# Patient Record
Sex: FEMALE | Race: BLACK OR AFRICAN AMERICAN | NOT HISPANIC OR LATINO | Employment: UNEMPLOYED | URBAN - METROPOLITAN AREA
[De-identification: names, ages, dates, MRNs, and addresses within clinical notes are randomized per-mention and may not be internally consistent; named-entity substitution may affect disease eponyms.]

---

## 2019-05-10 ENCOUNTER — OFFICE VISIT CONVERTED (OUTPATIENT)
Dept: INTERNAL MEDICINE | Facility: CLINIC | Age: 19
End: 2019-05-10
Attending: NURSE PRACTITIONER

## 2020-06-10 ENCOUNTER — OFFICE VISIT CONVERTED (OUTPATIENT)
Dept: INTERNAL MEDICINE | Facility: CLINIC | Age: 20
End: 2020-06-10
Attending: NURSE PRACTITIONER

## 2020-06-10 ENCOUNTER — HOSPITAL ENCOUNTER (OUTPATIENT)
Dept: OTHER | Facility: HOSPITAL | Age: 20
Discharge: HOME | End: 2020-06-10
Attending: NURSE PRACTITIONER

## 2020-06-10 LAB
ALBUMIN SERPL-MCNC: 4 G/DL (ref 3.5–5)
ALBUMIN/GLOB SERPL: 1.1 {RATIO} (ref 1.4–2.6)
ALP SERPL-CCNC: 54 U/L (ref 42–98)
ALT SERPL-CCNC: 17 U/L (ref 10–40)
ANION GAP SERPL CALC-SCNC: 14 MMOL/L (ref 8–19)
AST SERPL-CCNC: 21 U/L (ref 15–50)
BASOPHILS # BLD AUTO: 0.04 10*3/UL (ref 0–0.2)
BASOPHILS NFR BLD AUTO: 0.8 % (ref 0–3)
BILIRUB SERPL-MCNC: 0.24 MG/DL (ref 0.2–1.3)
BUN SERPL-MCNC: 7 MG/DL (ref 5–25)
BUN/CREAT SERPL: 10 {RATIO} (ref 6–20)
CALCIUM SERPL-MCNC: 9.8 MG/DL (ref 8.7–10.4)
CHLORIDE SERPL-SCNC: 102 MMOL/L (ref 99–111)
CHOLEST SERPL-MCNC: 186 MG/DL (ref 107–200)
CHOLEST/HDLC SERPL: 2.9 {RATIO} (ref 3–6)
CONV ABS IMM GRAN: 0.01 10*3/UL (ref 0–0.2)
CONV CO2: 26 MMOL/L (ref 22–32)
CONV IMMATURE GRAN: 0.2 % (ref 0–1.8)
CONV TOTAL PROTEIN: 7.7 G/DL (ref 6.3–8.2)
CREAT UR-MCNC: 0.7 MG/DL (ref 0.5–0.9)
DEPRECATED RDW RBC AUTO: 40 FL (ref 36.4–46.3)
EOSINOPHIL # BLD AUTO: 0.12 10*3/UL (ref 0–0.7)
EOSINOPHIL # BLD AUTO: 2.4 % (ref 0–7)
ERYTHROCYTE [DISTWIDTH] IN BLOOD BY AUTOMATED COUNT: 11.7 % (ref 11.7–14.4)
GFR SERPLBLD BASED ON 1.73 SQ M-ARVRAT: >60 ML/MIN/{1.73_M2}
GLOBULIN UR ELPH-MCNC: 3.7 G/DL (ref 2–3.5)
GLUCOSE SERPL-MCNC: 88 MG/DL (ref 65–99)
HCT VFR BLD AUTO: 43.7 % (ref 37–47)
HDLC SERPL-MCNC: 65 MG/DL (ref 40–60)
HGB BLD-MCNC: 14.5 G/DL (ref 12–16)
LDLC SERPL CALC-MCNC: 101 MG/DL (ref 70–100)
LYMPHOCYTES # BLD AUTO: 1.46 10*3/UL (ref 1–5)
LYMPHOCYTES NFR BLD AUTO: 28.7 % (ref 20–45)
MCH RBC QN AUTO: 31.2 PG (ref 27–31)
MCHC RBC AUTO-ENTMCNC: 33.2 G/DL (ref 33–37)
MCV RBC AUTO: 94 FL (ref 81–99)
MONOCYTES # BLD AUTO: 0.38 10*3/UL (ref 0.2–1.2)
MONOCYTES NFR BLD AUTO: 7.5 % (ref 3–10)
NEUTROPHILS # BLD AUTO: 3.07 10*3/UL (ref 2–8)
NEUTROPHILS NFR BLD AUTO: 60.4 % (ref 30–85)
NRBC CBCN: 0 % (ref 0–0.7)
OSMOLALITY SERPL CALC.SUM OF ELEC: 283 MOSM/KG (ref 273–304)
PLATELET # BLD AUTO: 324 10*3/UL (ref 130–400)
PMV BLD AUTO: 9.4 FL (ref 9.4–12.3)
POTASSIUM SERPL-SCNC: 4.1 MMOL/L (ref 3.5–5.3)
RBC # BLD AUTO: 4.65 10*6/UL (ref 4.2–5.4)
SODIUM SERPL-SCNC: 138 MMOL/L (ref 135–147)
TRIGL SERPL-MCNC: 99 MG/DL (ref 40–150)
TSH SERPL-ACNC: 1.15 M[IU]/L (ref 0.27–4.2)
VLDLC SERPL-MCNC: 20 MG/DL (ref 5–37)
WBC # BLD AUTO: 5.08 10*3/UL (ref 4.8–10.8)

## 2020-07-10 ENCOUNTER — CONVERSION ENCOUNTER (OUTPATIENT)
Dept: INTERNAL MEDICINE | Facility: CLINIC | Age: 20
End: 2020-07-10

## 2020-07-10 ENCOUNTER — OFFICE VISIT CONVERTED (OUTPATIENT)
Dept: INTERNAL MEDICINE | Facility: CLINIC | Age: 20
End: 2020-07-10
Attending: NURSE PRACTITIONER

## 2020-07-15 ENCOUNTER — HOSPITAL ENCOUNTER (OUTPATIENT)
Dept: CARDIOLOGY | Facility: HOSPITAL | Age: 20
Discharge: HOME | End: 2020-07-15
Attending: NURSE PRACTITIONER

## 2021-01-20 ENCOUNTER — HOSPITAL ENCOUNTER (OUTPATIENT)
Dept: OTHER | Facility: HOSPITAL | Age: 21
Discharge: HOME | End: 2021-01-20
Attending: PHYSICIAN ASSISTANT

## 2021-01-20 ENCOUNTER — CONVERSION ENCOUNTER (OUTPATIENT)
Dept: INTERNAL MEDICINE | Facility: CLINIC | Age: 21
End: 2021-01-20

## 2021-01-20 ENCOUNTER — OFFICE VISIT CONVERTED (OUTPATIENT)
Dept: INTERNAL MEDICINE | Facility: CLINIC | Age: 21
End: 2021-01-20
Attending: PHYSICIAN ASSISTANT

## 2021-02-05 ENCOUNTER — HOSPITAL ENCOUNTER (OUTPATIENT)
Dept: OTHER | Facility: HOSPITAL | Age: 21
Discharge: HOME | End: 2021-02-05
Attending: NURSE PRACTITIONER

## 2021-02-05 ENCOUNTER — OFFICE VISIT CONVERTED (OUTPATIENT)
Dept: INTERNAL MEDICINE | Facility: CLINIC | Age: 21
End: 2021-02-05
Attending: NURSE PRACTITIONER

## 2021-02-11 LAB
CONV LAST MENSTURAL PERIOD: NORMAL
SPECIMEN SOURCE: NORMAL
SPECIMEN SOURCE: NORMAL
THIN PREP CVX: NORMAL

## 2021-02-24 ENCOUNTER — OFFICE VISIT CONVERTED (OUTPATIENT)
Dept: INTERNAL MEDICINE | Facility: CLINIC | Age: 21
End: 2021-02-24
Attending: PHYSICIAN ASSISTANT

## 2021-02-24 ENCOUNTER — HOSPITAL ENCOUNTER (OUTPATIENT)
Dept: OTHER | Facility: HOSPITAL | Age: 21
Discharge: HOME | End: 2021-02-24
Attending: PHYSICIAN ASSISTANT

## 2021-02-25 LAB
CONV HIV-1/ HIV-2: NONREACTIVE
HSV I/II IGM: <0.91 RATIO (ref 0–0.9)
HSV1 IGG SER IA-ACNC: <0.91 INDEX (ref 0–0.9)
HSV2 IGG SER IA-ACNC: <0.91 INDEX (ref 0–0.9)
RPR SER QL: NORMAL

## 2021-02-27 LAB
C TRACH RRNA CVX QL NAA+PROBE: NEGATIVE
N GONORRHOEA DNA SPEC QL NAA+PROBE: NEGATIVE

## 2021-03-04 ENCOUNTER — CONVERSION ENCOUNTER (OUTPATIENT)
Dept: INTERNAL MEDICINE | Facility: CLINIC | Age: 21
End: 2021-03-04

## 2021-03-04 ENCOUNTER — OFFICE VISIT CONVERTED (OUTPATIENT)
Dept: INTERNAL MEDICINE | Facility: CLINIC | Age: 21
End: 2021-03-04
Attending: PHYSICIAN ASSISTANT

## 2021-03-04 LAB — B-HEM STREP SPEC QL CULT: POSITIVE

## 2021-05-13 NOTE — PROGRESS NOTES
Progress Note      Patient Name: Shree Saenz   Patient ID: [id number]   Sex: Female   YOB: 2000        Visit Date: July 10, 2020    Provider: TRINO Ferris   Location: [facility]   Location Address: [address] [place] [address] [address]  [place], [place]  [id number]   Location Phone: [phone]          Chief Complaint  · check up from last month for chest pain  · bumps/change in texture under tongue x approx 1 week       History Of Present Illness  Shree Saenz is a 19 year old female who presents for evaluation and treatment of:      Chest pain-  One month follow up for chest pain, patient states symptoms have become less frequent since previous visit. Last episode earlier this week, this was very infrequent to begin with. Denies shortness of breath, diaphoresis. She is not sure if this is because acid reflux medication is working or because her anxiety has improved being at home due to COVID19. EKG WNL at last appointment, patient states she did not receive information on holter monitor and missed the appointment. She would like to reschedule this today.    Patient reports she had some bumps under her tongue that were irritating, have started to resolve. She is due to see a dentist this month.       Past Medical History  Disease Name Date Onset Notes   Acne --  --    Seasonal allergies --  --          Medication List  Name Date Started Instructions   Allegra Allergy 180 mg oral tablet 06/10/2020 take 1 tablet (180 mg) by oral route once daily   Cyclafem 1/35 (28) 1-35 mg-mcg oral tablet 06/10/2020 take 1 tablet by oral route once daily for 30 days   ferrous sulfate 325 mg (65 mg iron) oral tablet  take 1 tablet (325 mg) by oral route once daily   pantoprazole 20 mg oral tablet,delayed release (DR/EC) 06/10/2020 take 1 tablet (20 mg) by oral route once daily   Singulair 10 mg oral tablet 06/10/2020 take 1 tablet (10 mg) by oral route once daily in the evening for 30 days  "        Allergy List  Allergen Name Date Reaction Notes   NO KNOWN DRUG ALLERGIES --  --  --        Allergies Reconciled  Family Medical History  Disease Name Relative/Age Notes   Lung cancer  --          Review of Systems  · Constitutional  o Denies  o : fever, fatigue, weight loss, weight gain  · HENT  o Admits  o : mouth lesions  o Denies  o : dry mouth, dysphagia  · Cardiovascular  o Admits  o : chest pain  o Denies  o : lower extremity edema, palpitations  · Respiratory  o Denies  o : shortness of breath, wheezing, frequent cough, dyspnea on exertion  · Gastrointestinal  o Admits  o : reflux/heartburn  o Denies  o : abdominal pain, nausea, vomiting, constipation  · Psychiatric  o Admits  o : anxiety  o Denies  o : depression, suicidal ideation, homicidal ideation      Vitals  Date Time BP Position Site L\R Cuff Size HR RR TEMP (F) WT  HT  BMI kg/m2 BSA m2 O2 Sat HC       05/10/2019 03:32 /82 Sitting    79 - R 16 97.7 179lbs 0oz 5'  7\" 28.04 1.96 100 %    06/10/2020 03:57 /76 Sitting    100 - R  98.5 179lbs 0oz 5'  7\" 28.04 1.96 100 %    07/10/2020 08:23 /72 Sitting    68 - R 16 98.3 178lbs 4oz 5'  7\" 27.92 1.96 100 %          Physical Examination  · Constitutional  o Appearance  o : no acute distress, well-nourished  · Head and Face  o Head  o :   § Inspection  § : atraumatic, normocephalic  · Eyes  o Eyes  o : extraocular movements intact, no scleral icterus, no conjunctival injection  · Ears, Nose, Mouth and Throat  o Ears  o :   § External Ears  § : normal  o Nose  o :   § Intranasal Exam  § : nares patent  o Oral Cavity  o :   § Oral Mucosa  § : moist mucous membranes  o Throat  o :   § Oropharynx  § : no inflammation or lesions present, tonsils within normal limits  · Respiratory  o Respiratory Effort  o : breathing comfortably, symmetric chest rise  o Auscultation of Lungs  o : clear to asculatation bilaterally, no wheezes, rales, or rhonchii  · Cardiovascular  o Heart  o : "   § Auscultation of Heart  § : regular rate and rhythm, no murmurs, rubs, or gallops  o Peripheral Vascular System  o :   § Extremities  § : no edema  · Lymphatic  o Neck  o : no lymphadenopathy present  o Supraclavicular Nodes  o : no supraclavicular nodes  · Skin and Subcutaneous Tissue  o General Inspection  o : no lesions present, no areas of discoloration, skin turgor normal  · Neurologic  o Mental Status Examination  o :   § Orientation  § : grossly oriented to person, place and time  o Gait and Station  o :   § Gait Screening  § : normal gait  · Psychiatric  o General  o : normal mood and affect              Assessment  · Screening for depression     V79.0/Z13.89  PHQ9 score of 5.  · Chest pain     786.50/R07.9  Improved. EKG WNL 6/2020. Will reschedule holter. Likely secondary to GERD/anxiety. Will determine if further intervention is necessary based on results of holter. Patient to seek medical attention immediately with severe/persistent chest pain, shortness of breath, diaphoresis. Could consider cardiology referral if warranted. Will follow up in three months, sooner if concerns arise.  · GERD (gastroesophageal reflux disease)     530.81/K21.9  Well controlled, continue pantoprazole.  · Anxiety     300.00/F41.9  Well controlled per patient without medications. She will continue to monitor and seek medical attention immediately if she feels that her mental health is deteriorating. Denies SI/HI. Will continue to monitor.   · Mouth symptom     528.9/R19.8  Without lesion on exam. Will provide magic mouthwash for comfort. Patient to follow up with dentistry as scheduled. Will call or return to clinic if symptoms worsen or persist.     Problems Reconciled  Plan  · Orders  o ACO-39: Current medications updated and reviewed () - - 07/10/2020  · Medications  o Medications have been Reconciled  o Transition of Care or Provider Policy  · Instructions  o Depression Screen completed and scanned into the EMR  under the designated folder within the patient's documents.  o Today's PHQ-9 result is 5  o Take all medications as prescribed/directed.  o Patient was educated/instructed on their diagnosis, treatment and medications prior to discharge from the clinic today.  o Patient instructed to seek medical attention urgently for new or worsening symptoms.  o Call the office with any concerns or questions.  · Disposition  o Call or Return if symptoms worsen or persist.  o Follow up in 3 months  o Prescriptions given in clinic            Electronically Signed by: TRINO Ferris -Author on July 10, 2020 09:11:15 AM

## 2021-05-14 VITALS
DIASTOLIC BLOOD PRESSURE: 86 MMHG | BODY MASS INDEX: 27.68 KG/M2 | WEIGHT: 176.37 LBS | TEMPERATURE: 98.4 F | OXYGEN SATURATION: 98 % | HEIGHT: 67 IN | SYSTOLIC BLOOD PRESSURE: 122 MMHG | HEART RATE: 87 BPM

## 2021-05-14 VITALS
DIASTOLIC BLOOD PRESSURE: 82 MMHG | OXYGEN SATURATION: 99 % | HEART RATE: 106 BPM | RESPIRATION RATE: 15 BRPM | TEMPERATURE: 97.9 F | SYSTOLIC BLOOD PRESSURE: 130 MMHG | WEIGHT: 173.5 LBS | HEIGHT: 67 IN | BODY MASS INDEX: 27.23 KG/M2

## 2021-05-14 VITALS
BODY MASS INDEX: 28.92 KG/M2 | HEART RATE: 94 BPM | OXYGEN SATURATION: 100 % | WEIGHT: 184.25 LBS | SYSTOLIC BLOOD PRESSURE: 128 MMHG | TEMPERATURE: 98.5 F | DIASTOLIC BLOOD PRESSURE: 78 MMHG | HEIGHT: 67 IN

## 2021-05-14 VITALS
DIASTOLIC BLOOD PRESSURE: 80 MMHG | BODY MASS INDEX: 28.41 KG/M2 | SYSTOLIC BLOOD PRESSURE: 128 MMHG | WEIGHT: 181 LBS | HEIGHT: 67 IN | TEMPERATURE: 98.1 F | RESPIRATION RATE: 15 BRPM | OXYGEN SATURATION: 99 % | HEART RATE: 89 BPM

## 2021-05-14 NOTE — PROGRESS NOTES
Progress Note      Patient Name: Shree Saenz   Patient ID: [id number]   Sex: Female   YOB: 2000        Visit Date: February 24, 2021    Provider: Michelle Guthrie PA-C   Location: [facility]   Location Address: [address] [place] [address], [address]  [place], [place]  [id number]   Location Phone: [phone]          Chief Complaint  · vaginal lesion       History Of Present Illness  Shree Saenz is a 20 year old /Black female who presents for evaluation and treatment of:      vaginal lesion, noticed 1 wk ago  Feels dry, like irritation. She shaves vaginal area.   Recently had yeast infection and irritation feels similar. Denies discharge. She has had itching.   Admits to not using condoms with intercourse.   She admits to douching at times and using scented soaps. She takes showers.  Denies new soaps or lotion.  Denies fever.   Denies abd pain, NVDC, urgency, frequency, hematuria.       Past Medical History  Disease Name Date Onset Notes   Acne --  --    Seasonal allergies --  --          Medication List  Name Date Started Instructions   Allegra Allergy 180 mg oral tablet 09/21/2020 take 1 tablet (180 mg) by oral route once daily for 90 days   Cyclafem 1/35 (28) 1-35 mg-mcg oral tablet 02/24/2021 take 1 tablet by oral route once daily for 90 days   pantoprazole 20 mg oral tablet,delayed release (DR/EC) 02/24/2021 take 1 tablet (20 mg) by oral route once daily for 90 days   Singulair 10 mg oral tablet 09/21/2020 take 1 tablet (10 mg) by oral route once daily in the evening for 90 days         Allergy List  Allergen Name Date Reaction Notes   NO KNOWN DRUG ALLERGIES --  --  --        Allergies Reconciled  Family Medical History  Disease Name Relative/Age Notes   Lung cancer  --          Social History  Finding Status Start/Stop Quantity Notes   Tobacco Never --/-- --  --          Vitals  Date Time BP Position Site L\R Cuff Size HR RR TEMP (F) WT  HT  BMI kg/m2 BSA m2 O2 Sat  "FR L/min FiO2 HC       01/20/2021 02:03 /78 Sitting    94 - R  98.5 184lbs 4oz 5'  7\" 28.86 1.99 100 %  21%    02/05/2021 01:38 /86 Sitting    87 - R  98.4 176lbs 6oz 5'  7\" 27.62 1.94 98 %  21%    02/24/2021 08:19 /80 Sitting    89 - R 15 98.1 181lbs 0oz 5'  7\" 28.35 1.97 99 %            Physical Examination  · Constitutional  o Appearance  o : no acute distress, well-nourished  · Head and Face  o Head  o :   § Inspection  § : atraumatic, normocephalic  · Eyes  o Eyes  o : extraocular movements intact, no scleral icterus, no conjunctival injection  · Ears, Nose, Mouth and Throat  o Ears  o :   § External Ears  § : normal  o Nose  o :   § Intranasal Exam  § : nares patent  o Oral Cavity  o :   § Oral Mucosa  § : moist mucous membranes  · Respiratory  o Respiratory Effort  o : breathing comfortably, symmetric chest rise  o Auscultation of Lungs  o : clear to asculatation bilaterally, no wheezes, rales, or rhonchii  · Cardiovascular  o Heart  o :   § Auscultation of Heart  § : regular rate and rhythm, no murmurs, rubs, or gallops  o Peripheral Vascular System  o :   § Extremities  § : no edema  · Gastrointestinal  o Abdominal Examination  o :   § Abdomen  § : bowel sounds present, non-distended, non-tender  · Genitourinary  o FEMALE  EXAM:  o :   §  and rectal exam deferred  § :  and rectal exam deferred  o External Genitalia  o : no inflammation, no lesions present  o Vagina  o : normal vaginal vault, no discharge present, no inflammatory lesions present, no masses present  o Urethra  o :   § Urethral Meatus  § : no inflammation present  o Bladder  o : nontender to palpation  o Cervix  o : appearance healthy, no lesions present, nontender to palpation, no discharges, no bleeding present, normal midline position  o Uterus  o : nontender to palpation, no masses present, position midline/midplane  o Adnexa  o : no tenderness or masses present on bimanual examination  o Perineum  o : perineum " within normal limits  · Skin and Subcutaneous Tissue  o General Inspection  o : no lesions present, no areas of discoloration, skin turgor normal  · Neurologic  o Mental Status Examination  o :   § Orientation  § : grossly oriented to person, place and time  o Gait and Station  o :   § Gait Screening  § : normal gait  · Psychiatric  o General  o : normal mood and affect                    Assessment  · Vaginitis     616.10/N76.0  Discussed ddx vaginitis, vaginal screen sent today. STD testing sent due to high risk behavior and recent irritation noted on pap (reviewed). Will tx based on results  · High risk sexual behavior     V69.2/Z72.51  STD testing today. Encouraged condom use.      Plan  · Orders  o ACO-39: Current medications updated and reviewed (, 1159F) - - 02/24/2021  o STD Panel (HSV 1 and 2 IgG/IgM, HIV, RPR, GC/Chlamydia) [facility] (50787, 47612, 03445, 41354, 80033, CTNGX, ) - 616.10/N76.0, V69.2/Z72.51 - 02/24/2021  o Vaginal Screen (Candida, Gardnerella & Trichomonas) (19327) - 616.10/N76.0, V69.2/Z72.51 - 02/24/2021  · Medications  o Cyclafem 1/35 (28) 1-35 mg-mcg oral tablet   SIG: take 1 tablet by oral route once daily for 90 days   DISP: (90) Tablet with 1 refills  Refilled on 02/24/2021     o pantoprazole 20 mg oral tablet,delayed release (DR/EC)   SIG: take 1 tablet (20 mg) by oral route once daily for 90 days   DISP: (90) Tablet with 1 refills  Refilled on 02/24/2021     o Medications have been Reconciled  o Transition of Care or Provider Policy  · Instructions  o Patient was educated/instructed on their diagnosis, treatment and medications prior to discharge from the clinic today.  · Disposition  o Call or Return if symptoms worsen or persist.  o Keep follow up appt as scheduled            Electronically Signed by: Michelle Guthrie PA-C -Author on February 24, 2021 09:02:46 AM

## 2021-05-14 NOTE — PROGRESS NOTES
Progress Note      Patient Name: Shree Saenz   Patient ID: [id number]   Sex: Female   YOB: 2000    Primary Care Provider: Gretchen JAMESON    Visit Date: March 4, 2021    Provider: Michelle Guthrie PA-C   Location: [facility]   Location Address: [address] [place] [address], [address]  [place], [place]  [id number]   Location Phone: [phone]          Chief Complaint  · sore throat       History Of Present Illness  Shree Saenz is a 20 year old /Black female who presents for evaluation and treatment of:      sore throat x1 week. She states pain worsened last 3 days, painful to swallow now.  Had hoarseness also.  She has noticed swollen lymph nodes.  She has had dry cough. Denies wheezing, sob, fever, body aches, chills, ear pain, ha.  She has been using nasal spray.   She has tried otc pain relief and gargling salt water.  No sick contacts.       Past Medical History  Disease Name Date Onset Notes   Acne --  --    Seasonal allergies --  --          Medication List  Name Date Started Instructions   Allegra Allergy 180 mg oral tablet 09/21/2020 take 1 tablet (180 mg) by oral route once daily for 90 days   Cyclafem 1/35 (28) 1-35 mg-mcg oral tablet 02/24/2021 take 1 tablet by oral route once daily for 90 days   pantoprazole 20 mg oral tablet,delayed release (DR/EC) 02/24/2021 take 1 tablet (20 mg) by oral route once daily for 90 days   Singulair 10 mg oral tablet 09/21/2020 take 1 tablet (10 mg) by oral route once daily in the evening for 90 days         Allergy List  Allergen Name Date Reaction Notes   NO KNOWN DRUG ALLERGIES --  --  --        Allergies Reconciled  Family Medical History  Disease Name Relative/Age Notes   Lung cancer  --          Social History  Finding Status Start/Stop Quantity Notes   Tobacco Never --/-- --  --          Vitals  Date Time BP Position Site L\R Cuff Size HR RR TEMP (F) WT  HT  BMI kg/m2 BSA m2 O2 Sat FR L/min FiO2 HC       02/05/2021 01:38  "/86 Sitting    87 - R  98.4 176lbs 6oz 5'  7\" 27.62 1.94 98 %  21%    02/24/2021 08:19 /80 Sitting    89 - R 15 98.1 181lbs 0oz 5'  7\" 28.35 1.97 99 %      03/04/2021 12:01 /82 Sitting    106 - R 15 97.9 173lbs 8oz 5'  7\" 27.17 1.93 99 %            Physical Examination  · Constitutional  o Appearance  o : no acute distress, well-nourished  · Head and Face  o Head  o :   § Inspection  § : atraumatic, normocephalic  · Eyes  o Eyes  o : extraocular movements intact, no scleral icterus, no conjunctival injection  · Ears, Nose, Mouth and Throat  o Ears  o :   § External Ears  § : normal  § Otoscopic Examination  § : tympanic membrane appearance within normal limits bilaterally  o Nose  o :   § Intranasal Exam  § : nares patent  o Oral Cavity  o :   § Oral Mucosa  § : moist mucous membranes  o Throat  o :   § Oropharynx  § : tonsils edematous, erythematous  · Respiratory  o Respiratory Effort  o : breathing comfortably, symmetric chest rise  o Auscultation of Lungs  o : clear to asculatation bilaterally, no wheezes, rales, or rhonchii  · Cardiovascular  o Heart  o :   § Auscultation of Heart  § : regular rate and rhythm, no murmurs, rubs, or gallops  o Peripheral Vascular System  o :   § Extremities  § : no edema  · Lymphatic  o Neck  o : lymphadenopathy present  o Supraclavicular Nodes  o : no supraclavicular nodes  · Neurologic  o Mental Status Examination  o :   § Orientation  § : grossly oriented to person, place and time  o Gait and Station  o :   § Gait Screening  § : normal gait  · Psychiatric  o General  o : normal mood and affect          Results  · In-Office Procedures  o Lab procedure  § IOP - Rapid Strep (22435)   § Beta Strep Gp A Culture: Positive   § Internal Control Verified?: No       Assessment  · Strep pharyngitis     034.0/J02.0  Discussed strep throat. Antibiotic sent today. Increase fluids, rest, hand hygiene, warm salt water gargles. Tylenol/motrin PRN fever, body aches, or pain. " Do not share drinks, cups, or food with others. Discussed this is contagious. Make sure pt is staying hydrated and urinating at least every 6 hrs. Patient and parent understood these instructions and will follow up in the office in 10 days to 2 weeks if symptoms not improving.      Plan  · Orders  o ACO-39: Current medications updated and reviewed (, 1159F) - - 03/04/2021  · Medications  o amoxicillin 500 mg oral capsule   SIG: take 1 capsule (500 mg) by oral route every 12 hours for 7 days   DISP: (14) Capsule with 0 refills  Prescribed on 03/04/2021     o fluticasone propionate 50 mcg/actuation nasal spray,suspension   SIG: spray 1 spray (50 mcg) in each nostril by intranasal route once daily   DISP: (1) Puff with 2 refills  Prescribed on 03/04/2021     o Medications have been Reconciled  o Transition of Care or Provider Policy  · Instructions  o Handouts were given to patient: strep  o Patient was educated/instructed on their diagnosis, treatment and medications prior to discharge from the clinic today.  o Electronically Identified Patient Education Materials Provided Electronically  · Disposition  o Call or Return if symptoms worsen or persist.            Electronically Signed by: Michelle Guthrie PA-C -Author on March 4, 2021 01:13:16 PM

## 2021-05-14 NOTE — PROGRESS NOTES
Progress Note      Patient Name: Shree Saenz   Patient ID: [id number]   Sex: Female   YOB: 2000        Visit Date: January 20, 2021    Provider: Alana Matta PA-C   Location: [facility]   Location Address: [address] [place] [address], [facility] [address]  [place], [place]  [id number]   Location Phone: [phone]          Chief Complaint  · Possible yeast infection       History Of Present Illness  Shree Saenz is a 20 year old /Black female who presents for evaluation and treatment of:      pt believes she may have a yeast infection- states she is having vaginal discomfort along with different discharge than she usually has.  noticed it right after period.  Discharge looks thick and clear.  She is having vaginal discomfort as well.  Patient thinks that she saw cut in her vagina but that seems to be healing.  She is currently sexually active.  No new partners recently.  No STD's, BV or yeast.  She has never had a Pap smear    Patient is complaining of generalized fatigue that has been going on for the past month or 2.  She is interested in checking vitamins to see if she is deficient.  No other symptoms at this time.         Past Medical History  Disease Name Date Onset Notes   Acne --  --    Seasonal allergies --  --          Medication List  Name Date Started Instructions   Allegra Allergy 180 mg oral tablet 09/21/2020 take 1 tablet (180 mg) by oral route once daily for 90 days   Cyclafem 1/35 (28) 1-35 mg-mcg oral tablet 11/30/2020 take 1 tablet by oral route once daily for 30 days   ferrous sulfate 325 mg (65 mg iron) oral tablet  take 1 tablet (325 mg) by oral route once daily   pantoprazole 20 mg oral tablet,delayed release (DR/EC) 01/20/2021 take 1 tablet (20 mg) by oral route once daily for 30 days   Singulair 10 mg oral tablet 09/21/2020 take 1 tablet (10 mg) by oral route once daily in the evening for 90 days         Allergy List  Allergen Name Date Reaction Notes  "  NO KNOWN DRUG ALLERGIES --  --  --        Allergies Reconciled  Family Medical History  Disease Name Relative/Age Notes   Lung cancer  --          Social History  Finding Status Start/Stop Quantity Notes   Tobacco Never --/-- --  --          Review of Systems  · Constitutional  o Admits  o : fatigue  o Denies  o : fever, weight loss, weight gain  · Cardiovascular  o Denies  o : lower extremity edema, claudication, chest pressure, palpitations  · Respiratory  o Denies  o : shortness of breath, wheezing, cough, hemoptysis, dyspnea on exertion  · Gastrointestinal  o Denies  o : nausea, vomiting, diarrhea, constipation, abdominal pain      Vitals  Date Time BP Position Site L\R Cuff Size HR RR TEMP (F) WT  HT  BMI kg/m2 BSA m2 O2 Sat FR L/min FiO2 HC       06/10/2020 03:57 /76 Sitting    100 - R  98.5 179lbs 0oz 5'  7\" 28.04 1.96 100 %  21%    07/10/2020 08:23 /72 Sitting    68 - R 16 98.3 178lbs 4oz 5'  7\" 27.92 1.96 100 %  21%    01/20/2021 02:03 /78 Sitting    94 - R  98.5 184lbs 4oz 5'  7\" 28.86 1.99 100 %  21%          Physical Examination  · Constitutional  o Appearance  o : no acute distress, well-nourished  · Head and Face  o Head  o :   § Inspection  § : atraumatic, normocephalic  · Ears, Nose, Mouth and Throat  o Ears  o :   § External Ears  § : normal  o Nose  o :   § Intranasal Exam  § : nares patent  o Oral Cavity  o :   § Oral Mucosa  § : moist mucous membranes  · Respiratory  o Respiratory Effort  o : breathing comfortably, symmetric chest rise  o Auscultation of Lungs  o : clear to asculatation bilaterally, no wheezes, rales, or rhonchii  · Cardiovascular  o Heart  o :   § Auscultation of Heart  § : regular rate and rhythm, no murmurs, rubs, or gallops  o Peripheral Vascular System  o :   § Extremities  § : no edema  · Skin and Subcutaneous Tissue  o General Inspection  o : External genitalia without cut, lesion or masses, no erythema, white vaginal discharge noted on " exam  · Neurologic  o Mental Status Examination  o :   § Orientation  § : grossly oriented to person, place and time  o Gait and Station  o :   § Gait Screening  § : normal gait              Assessment  · Fatigue     780.79/R53.83  Will check labs.  · Vaginal discharge     623.5/N89.8  Swabs done in office, will go ahead and check STDs as well since patient has not had this before. Will treat depending on results. Will have patient follow-up with Gretchen Daniel for well woman exam. Patient needs to return or go to the ER if she develops fever, abdominal pain or other worsening symptoms.    Problems Reconciled  Plan  · Orders  o Iron panel (iron, TIBC, transferrin saturation) (15997, 85760, 73893) - 780.79/R53.83 - 01/20/2021  o B12 Folate levels (B12FO) - 780.79/R53.83 - 01/20/2021  o CBC with Auto Diff [facility] (01050) - 623.5/N89.8, 780.79/R53.83 - 01/20/2021  o CMP [facility] (23553) - 623.5/N89.8, 780.79/R53.83 - 01/20/2021  o ACO-39: Current medications updated and reviewed (, 1159F) - - 01/20/2021  o Vitamin d 25 hydroxy panel (27305) - 623.5/N89.8, 780.79/R53.83 - 01/20/2021  o Vaginal Screen (Candida, Gardnerella & Trichomonas) (15504) - 623.5/N89.8 - 01/20/2021  o GC/Chlamydia by PCR (Urine or Vaginal Swab) [facility] ([facility]) - 623.5/N89.8 - 01/20/2021  o Herpes Simplex (HSV) IgM Types 1 and 2 [facility] (76588) - 623.5/N89.8, 780.79/R53.83 - 01/20/2021  o Herpes Simplex (HSV) IgG Specific Types 1 and 2 [facility] (62739, 74156) - 623.5/N89.8, 780.79/R53.83 - 01/20/2021  o HIV Screen by EIA [facility] (06288, ) - 623.5/N89.8, 780.79/R53.83 - 01/20/2021  o RPR Treponema pallidum antibody [facility] (51762) - 623.5/N89.8, 780.79/R53.83 - 01/20/2021  · Medications  o Medications have been Reconciled  o Transition of Care or Provider Policy  · Instructions  o Take all medications as prescribed/directed.  o Patient was educated/instructed on their diagnosis, treatment and medications prior to discharge from the clinic today.  o Patient instructed to  seek medical attention urgently for new or worsening symptoms.  o Call the office with any concerns or questions.  · Disposition  o Call or Return if symptoms worsen or persist.  o follow up as needed  o Labs drawn in clinic            Electronically Signed by: Alana Matta PA-C -Author on January 20, 2021 05:35:18 PM

## 2021-05-14 NOTE — PROGRESS NOTES
Progress Note      Patient Name: Shree Saenz   Patient ID: [id number]   Sex: Female   YOB: 2000        Visit Date: February 5, 2021    Provider: TRINO Ferris   Location: [facility]   Location Address: [address] [place] [address], [facility]  [place], [place]  [id number]   Location Phone: [phone]          Chief Complaint  · Annual Exam  · PAP exam  · (Health Maintainence Information Reviewed Under Results)      History Of Present Illness  Last PAP Smear: Never.   Date of Last Mammogram: Never.   Date of Last Colonoscopy: Never   No current complaints.   Shree Saenz is a 20 year old /Black female who presents for evaluation and treatment of:      Last PAP: Never  LMP: 2/2021    Denies vaginal lesion, discharge, dysuria or pain with sexual activity. Denies breast lumps, nipple discharge or changes in breast. Denies family history of breast cancer. Patient is sexually active, is without gynecological concern at this time.       Past Medical History  Disease Name Date Onset Notes   Acne --  --    Seasonal allergies --  --          Medication List  Name Date Started Instructions   Allegra Allergy 180 mg oral tablet 09/21/2020 take 1 tablet (180 mg) by oral route once daily for 90 days   Cyclafem 1/35 (28) 1-35 mg-mcg oral tablet 11/30/2020 take 1 tablet by oral route once daily for 30 days   pantoprazole 20 mg oral tablet,delayed release (DR/EC) 01/20/2021 take 1 tablet (20 mg) by oral route once daily for 30 days   Singulair 10 mg oral tablet 09/21/2020 take 1 tablet (10 mg) by oral route once daily in the evening for 90 days         Allergy List  Allergen Name Date Reaction Notes   NO KNOWN DRUG ALLERGIES --  --  --        Allergies Reconciled  Family Medical History  Disease Name Relative/Age Notes   Lung cancer  --          Social History  Finding Status Start/Stop Quantity Notes   Tobacco Never --/-- --  --          Review of  "Systems  · Constitutional  o Denies  o : appetite change, fatigue, night sweats, weight gain, weight loss  · Breasts  o Denies  o : lumps, tenderness, swelling, nipple discharge  · Cardiovascular  o Denies  o : chest pain, dyspnea on exertion, palpitations  · Respiratory  o Denies  o : cough, shortness of breath, wheezing  · Gastrointestinal  o Denies  o : abdominal pain, nausea, vomiting, constipation, diarrhea  · Genitourinary  o Denies  o : dysuria, incontinence, frequency  · Heme-Lymph  o Denies  o : petechiae, lymph node enlargement or tenderness      Vitals  Date Time BP Position Site L\R Cuff Size HR RR TEMP (F) WT  HT  BMI kg/m2 BSA m2 O2 Sat FR L/min FiO2 HC       07/10/2020 08:23 /72 Sitting    68 - R 16 98.3 178lbs 4oz 5'  7\" 27.92 1.96 100 %  21%    01/20/2021 02:03 /78 Sitting    94 - R  98.5 184lbs 4oz 5'  7\" 28.86 1.99 100 %  21%    02/05/2021 01:38 /86 Sitting    87 - R  98.4 176lbs 6oz 5'  7\" 27.62 1.94 98 %  21%          Physical Examination  · Constitutional  o Appearance  o : well-nourished, in no acute distress  · Respiratory  o Respiratory Effort  o : breathing unlabored  o Inspection of Chest  o : normal appearance  o Auscultation of Lungs  o : normal breath sounds throughout  · Cardiovascular  o Heart  o :   § Auscultation of Heart  § : regular rate and rhythm, no murmurs, gallops or rubs  o Peripheral Vascular System  o :   § Extremities  § : no edema  · Breasts  o Inspection of Breasts  o : breasts symmetrical, no skin changes, no deformities present, no discharge present  o Palpation of Breasts, Axillae  o : no masses present on palpation, no breast tenderness  · Gastrointestinal  o Abdominal Examination  o : abdomen nontender to palpation, tone normal without rigidity or guarding, no masses present, normal bowel sounds  · Genitourinary  o External Genitalia  o : no inflammation, no lesions present  o Vagina  o : normal vaginal vault, no discharge present, no inflammatory " lesions present, no masses present  o Urethra  o :   § Urethral Meatus  § : no inflammation present  o Bladder  o : nontender to palpation  o Cervix  o : appearance healthy, no lesions present, nontender to palpation, no discharges, no bleeding present, normal midline position  o Uterus  o : nontender to palpation, no masses present, position midline/midplane  o Adnexa  o : no tenderness or masses present on bimanual examination  o Anus  o : no inflammation or lesions present  o Perineum  o : perineum within normal limits  · Lymphatic  o Axilla  o : no lymphadenopathy present  · Neurologic  o Mental Status Examination  o :   § Orientation  § : grossly oriented to person, place and time  o Gait and Station  o : normal gait, able to stand without difficulty  · Psychiatric  o Judgement and Insight  o : judgment and insight intact  o Mood and Affect  o : mood normal, affect appropriate              Assessment  · Routine gynecological examination     V72.31/Z01.419  Exam without concern. Will determine if further intervention is warranted based on results of PAP.   · Pap smear, as part of routine gynecological examination     V76.2/Z01.419    Problems Reconciled  Plan  · Orders  o Vaginal Screen (Candida, Gardnerella & Trichomonas) (84148) - V72.31/Z01.419 - 02/05/2021  o Pap smear (13053) - V76.2/Z01.419 - 02/05/2021  o ACO-39: Current medications updated and reviewed (1159F, ) - - 02/05/2021  · Medications  o Medications have been Reconciled  o Transition of Care or Provider Policy  · Instructions  o **Pap Test/Liquid Based:   o Thin Prep  o Source:  o Cervix  o ********  o **Perform Reflex Human Papilloma Virus (HPV) High Risk on this Pap (If atypical squamous cells of the undetermined signifigcance (ASCUS)/Atypical Glandular Cells of undetermined significance (AGCUS): Low Grade Squamous Intraepitheal lesion (LGSIL): **  o Yes, upon instruction from sending office  o ********  o Medicare:  o No  o **Is this an  annual PAP:  o Yes  o Counseled on monthly breast self exams.   o Counseled on STD prevention.  o Used cytobrush to obtain Pap smear specimen. Sent to pathology for testing and review.  o Patient was educated/instructed on their diagnosis, treatment and medications prior to discharge from the clinic today.            Electronically Signed by: TRINO Ferris -Author on February 5, 2021 02:41:59 PM

## 2021-05-15 VITALS
DIASTOLIC BLOOD PRESSURE: 76 MMHG | SYSTOLIC BLOOD PRESSURE: 118 MMHG | HEIGHT: 67 IN | TEMPERATURE: 98.5 F | BODY MASS INDEX: 28.09 KG/M2 | HEART RATE: 100 BPM | WEIGHT: 179 LBS | OXYGEN SATURATION: 100 %

## 2021-05-15 VITALS
HEART RATE: 79 BPM | DIASTOLIC BLOOD PRESSURE: 82 MMHG | SYSTOLIC BLOOD PRESSURE: 116 MMHG | HEIGHT: 67 IN | WEIGHT: 179 LBS | RESPIRATION RATE: 16 BRPM | TEMPERATURE: 97.7 F | BODY MASS INDEX: 28.09 KG/M2 | OXYGEN SATURATION: 100 %

## 2021-05-15 VITALS
OXYGEN SATURATION: 100 % | DIASTOLIC BLOOD PRESSURE: 72 MMHG | WEIGHT: 178.25 LBS | TEMPERATURE: 98.3 F | SYSTOLIC BLOOD PRESSURE: 110 MMHG | RESPIRATION RATE: 16 BRPM | HEART RATE: 68 BPM | HEIGHT: 67 IN | BODY MASS INDEX: 27.98 KG/M2

## 2021-06-10 ENCOUNTER — TELEPHONE (OUTPATIENT)
Dept: INTERNAL MEDICINE | Facility: CLINIC | Age: 21
End: 2021-06-10

## 2021-06-10 DIAGNOSIS — Z30.41 ENCOUNTER FOR SURVEILLANCE OF CONTRACEPTIVE PILLS: Primary | ICD-10-CM

## 2021-06-10 RX ORDER — FLUTICASONE PROPIONATE 50 MCG
1 SPRAY, SUSPENSION (ML) NASAL DAILY PRN
COMMUNITY
Start: 2021-03-04

## 2021-06-10 RX ORDER — FEXOFENADINE HCL 180 MG/1
1 TABLET ORAL DAILY
COMMUNITY
Start: 2021-05-07

## 2021-06-10 NOTE — TELEPHONE ENCOUNTER
PCP: Gretchen Daniel     LOV: 03/04/2021 with Michelle Guthrie.     Pulled in some medications from [facility] [facility]. Pharmacy updated and pended med for review and approval.

## 2021-06-10 NOTE — TELEPHONE ENCOUNTER
Caller: MARCELINA STANTON    Relationship: Mother    Best call back number: [phone]     Medication needed:   BIRTH CONTROL    When do you need the refill by: ASAP    Does the patient have less than a 3 day supply:  [x] Yes  [] No    What is the patient's preferred pharmacy:    [facility]  [address] [place] [phone]

## 2021-06-11 RX ORDER — NORETHINDRONE-ETHIN. ESTRADIOL 1 MG-35MCG
1 TABLET ORAL DAILY
Qty: 28 TABLET | Refills: 12 | Status: SHIPPED | OUTPATIENT
Start: 2021-06-11 | End: 2022-05-02 | Stop reason: SDUPTHER

## 2021-07-01 ENCOUNTER — OFFICE VISIT (OUTPATIENT)
Dept: INTERNAL MEDICINE | Facility: CLINIC | Age: 21
End: 2021-07-01

## 2021-07-01 VITALS
OXYGEN SATURATION: 99 % | DIASTOLIC BLOOD PRESSURE: 62 MMHG | HEIGHT: 67 IN | HEART RATE: 78 BPM | WEIGHT: 167 LBS | TEMPERATURE: 97.2 F | RESPIRATION RATE: 18 BRPM | SYSTOLIC BLOOD PRESSURE: 118 MMHG | BODY MASS INDEX: 26.21 KG/M2

## 2021-07-01 DIAGNOSIS — N76.0 VAGINOSIS: Primary | ICD-10-CM

## 2021-07-01 DIAGNOSIS — Z72.51 HIGH RISK SEXUAL BEHAVIOR, UNSPECIFIED TYPE: ICD-10-CM

## 2021-07-01 LAB
C TRACH RRNA CVX QL NAA+PROBE: NOT DETECTED
CANDIDA SPECIES: NEGATIVE
GARDNERELLA VAGINALIS: POSITIVE
N GONORRHOEA RRNA SPEC QL NAA+PROBE: NOT DETECTED
T VAGINALIS DNA VAG QL PROBE+SIG AMP: NEGATIVE

## 2021-07-01 PROCEDURE — 99213 OFFICE O/P EST LOW 20 MIN: CPT | Performed by: PHYSICIAN ASSISTANT

## 2021-07-01 PROCEDURE — 87591 N.GONORRHOEAE DNA AMP PROB: CPT | Performed by: PHYSICIAN ASSISTANT

## 2021-07-01 PROCEDURE — 87660 TRICHOMONAS VAGIN DIR PROBE: CPT | Performed by: PHYSICIAN ASSISTANT

## 2021-07-01 PROCEDURE — 87491 CHLMYD TRACH DNA AMP PROBE: CPT | Performed by: PHYSICIAN ASSISTANT

## 2021-07-01 PROCEDURE — 87480 CANDIDA DNA DIR PROBE: CPT | Performed by: PHYSICIAN ASSISTANT

## 2021-07-01 PROCEDURE — 87510 GARDNER VAG DNA DIR PROBE: CPT | Performed by: PHYSICIAN ASSISTANT

## 2021-07-01 RX ORDER — FERROUS SULFATE 325(65) MG
325 TABLET ORAL
COMMUNITY

## 2021-07-01 RX ORDER — MONTELUKAST SODIUM 10 MG/1
10 TABLET ORAL NIGHTLY
COMMUNITY
End: 2021-11-02 | Stop reason: SDUPTHER

## 2021-07-01 NOTE — PROGRESS NOTES
"Chief Complaint  vaginal irritation (has resolved now )    Subjective          Shree Saenz presents to [facility]  Pt states she had some discharge in vaginal area.  After intercourse she felt burning in vaginal area. She has a new sex partner. She has not been sexually active the past month and sx have slightly improved slightly. She feels she was allergic to the partner's semen.  Denies odor.   She states she has been scrubbing her vaginal area in order to get the discharge out.   She has not been using a condom.   Denies fever, abd pain, NVDC.  Admits to using scented soaps in vaginal area.      Past Medical History:   Diagnosis Date   • Acne    • Seasonal allergies         History reviewed. No pertinent surgical history.     Current Outpatient Medications on File Prior to Visit   Medication Sig Dispense Refill   • ferrous sulfate 325 (65 FE) MG tablet Take 325 mg by mouth Daily With Breakfast.     • fexofenadine (ALLEGRA) 180 MG tablet Take 1 tablet by mouth Daily.     • fluticasone (FLONASE) 50 MCG/ACT nasal spray 1 spray by Each Nare route Daily As Needed.     • montelukast (SINGULAIR) 10 MG tablet Take 10 mg by mouth Every Night.     • norethindrone-ethinyl estradiol (Cyclafem 1/35) 1-35 MG-MCG per tablet Take 1 tablet by mouth Daily. 28 tablet 12     No current facility-administered medications on file prior to visit.        No Known Allergies    Social History     Tobacco Use   Smoking Status Never Smoker   Smokeless Tobacco Never Used          Objective   Vital Signs:   /62   Pulse 78   Temp 97.2 °F (36.2 °C)   Resp 18   Ht 170.2 cm (67\")   Wt 75.8 kg (167 lb)   SpO2 99%   BMI 26.16 kg/m²     Physical Exam  Vitals reviewed.   Constitutional:       Appearance: Normal appearance.   HENT:      Head: Normocephalic and atraumatic.      Nose: Nose normal.      Mouth/Throat:      Mouth: Mucous membranes are moist.   Eyes:      Extraocular Movements: " Extraocular movements intact.      Conjunctiva/sclera: Conjunctivae normal.      Pupils: Pupils are equal, round, and reactive to light.   Cardiovascular:      Rate and Rhythm: Normal rate and regular rhythm.   Pulmonary:      Effort: Pulmonary effort is normal.      Breath sounds: Normal breath sounds.   Abdominal:      General: Abdomen is flat. Bowel sounds are normal.      Palpations: Abdomen is soft.   Musculoskeletal:         General: Normal range of motion.   Neurological:      General: No focal deficit present.      Mental Status: She is alert and oriented to person, place, and time.   Psychiatric:         Mood and Affect: Mood normal.        Result Review :                 Assessment and Plan    Diagnoses and all orders for this visit:    1. Vaginosis (Primary)  Comments:  Discussed ddx sx. Vaginal screen and G/c screen sent today. Discussed infalmattion on last pap. Encouraged non scented soap. Will tx based on results.  Orders:  -     Gardnerella vaginalis, Trichomonas vaginalis, Candida albicans, DNA - Swab, Vagina  -     Chlamydia trachomatis, Neisseria gonorrhoeae, PCR - , Urine, Clean Catch    2. High risk sexual behavior, unspecified type  Comments:  Discussed safe sex. Encouraged use of condoms every time to protect agatinst STD. Discussed long term risks of infection.  Orders:  -     Gardnerella vaginalis, Trichomonas vaginalis, Candida albicans, DNA - Swab, Vagina  -     Chlamydia trachomatis, Neisseria gonorrhoeae, PCR - , Urine, Clean Catch        Follow Up   No follow-ups on file.  Patient was given instructions and counseling regarding her condition or for health maintenance advice. Please see specific information pulled into the AVS if appropriate.

## 2021-07-02 RX ORDER — METRONIDAZOLE 500 MG/1
500 TABLET ORAL 2 TIMES DAILY
Qty: 14 TABLET | Refills: 0 | Status: SHIPPED | OUTPATIENT
Start: 2021-07-02 | End: 2021-07-09

## 2021-11-02 NOTE — TELEPHONE ENCOUNTER
Caller: MARCELINA STANTON    Relationship: Mother     Medication requested (name and dosage):   - montelukast (SINGULAIR) 10 MG tablet  - PANTROPROZALE     Pharmacy where request should be sent:   [facility] [place] - [place] - [address] - [phone]  - [phone]   [phone]    Additional details provided by patient:   PATIENT'S MOTHER CALLED TO GET REFILLS OF THESE MEDICATIONS. SHE IS CURRENTLY OUT.     Best call back number: [phone]     Does the patient have less than a 3 day supply:  [x] Yes  [] No    Liza De La Rosa Rep   11/02/21 17:00 EDT

## 2021-11-03 RX ORDER — MONTELUKAST SODIUM 10 MG/1
10 TABLET ORAL NIGHTLY
Qty: 90 TABLET | Refills: 1 | Status: SHIPPED | OUTPATIENT
Start: 2021-11-03

## 2021-11-09 ENCOUNTER — OFFICE VISIT (OUTPATIENT)
Dept: INTERNAL MEDICINE | Facility: CLINIC | Age: 21
End: 2021-11-09

## 2021-11-09 VITALS
HEART RATE: 86 BPM | WEIGHT: 178 LBS | BODY MASS INDEX: 27.88 KG/M2 | SYSTOLIC BLOOD PRESSURE: 122 MMHG | TEMPERATURE: 98.5 F | DIASTOLIC BLOOD PRESSURE: 84 MMHG | OXYGEN SATURATION: 100 %

## 2021-11-09 DIAGNOSIS — J02.9 PHARYNGITIS, UNSPECIFIED ETIOLOGY: Primary | ICD-10-CM

## 2021-11-09 LAB
EXPIRATION DATE: NORMAL
INTERNAL CONTROL: NORMAL
Lab: NORMAL
S PYO AG THROAT QL: NEGATIVE

## 2021-11-09 PROCEDURE — 87880 STREP A ASSAY W/OPTIC: CPT | Performed by: NURSE PRACTITIONER

## 2021-11-09 PROCEDURE — 87081 CULTURE SCREEN ONLY: CPT | Performed by: NURSE PRACTITIONER

## 2021-11-09 PROCEDURE — 99213 OFFICE O/P EST LOW 20 MIN: CPT | Performed by: NURSE PRACTITIONER

## 2021-11-09 RX ORDER — AMOXICILLIN 500 MG/1
500 CAPSULE ORAL 2 TIMES DAILY
Qty: 20 CAPSULE | Refills: 0 | Status: SHIPPED | OUTPATIENT
Start: 2021-11-09 | End: 2021-11-19

## 2021-11-09 NOTE — ASSESSMENT & PLAN NOTE
High suspicion for strep pharyngitis based on physical exam. Although rapid negative in clinic will send for culture and treat with amoxicillin at this time. Discussed with parent that COVID19 cannot be ruled out, they defer testing at this time. Discussed supportive care with tylenol/ibuprofen, salt water gargles, warm tea with honey, throat lozenges. They will seek medical attention immediately if symptoms worsen or persist. Call or return to clinic with concerns.

## 2021-11-09 NOTE — PROGRESS NOTES
"Chief Complaint  Sore Throat (body chills, white spots on tonsils, lymph nodes swelling)    Subjective          Shree Saenz presents to [facility]  Patient reports last week she started with chills, a few days later developed sore throat. Temperature max 99.8. Patient reports after a day or two the lymph nodes behind her ears and in her neck became swollen and tender. States two days ago she noticed white spots on her tonsils and tried popping them off because she thought they may be tonsil stones. Denies cough, shortness of breath, loss of taste/smell. Decreased appetite, drinking okay. Swallowing hurts, did improve with ibuprofen. Have been around someone that was coughing but states \"he is always coughing\". Defers influenza and COVID19 testing at this time. Denies known COVID19 exposures, patient is vaccinated.       Objective   Vital Signs:   /84   Pulse 86   Temp 98.5 °F (36.9 °C)   Wt 80.7 kg (178 lb)   SpO2 100%   BMI 27.88 kg/m²     Physical Exam  Constitutional:       Appearance: Normal appearance. She is normal weight.   HENT:      Head: Normocephalic and atraumatic.      Ears:      Comments: Bilateral TM nonpurulent effusion     Nose: Nose normal.      Mouth/Throat:      Mouth: Mucous membranes are moist.      Pharynx: Oropharynx is clear.      Comments: Tonsils 3+, erythematous, edematous, with white exudate  Eyes:      Extraocular Movements: Extraocular movements intact.      Conjunctiva/sclera: Conjunctivae normal.      Pupils: Pupils are equal, round, and reactive to light.   Cardiovascular:      Rate and Rhythm: Normal rate and regular rhythm.      Heart sounds: Normal heart sounds.   Pulmonary:      Effort: Pulmonary effort is normal.      Breath sounds: Normal breath sounds.   Lymphadenopathy:      Head:      Right side of head: Tonsillar and posterior auricular adenopathy present.      Left side of head: Tonsillar and posterior " auricular adenopathy present.   Skin:     General: Skin is warm and dry.   Neurological:      General: No focal deficit present.      Mental Status: She is alert and oriented to person, place, and time.   Psychiatric:         Mood and Affect: Mood normal.         Behavior: Behavior normal.         Thought Content: Thought content normal.        Result Review :                 Assessment and Plan    Diagnoses and all orders for this visit:    1. Pharyngitis, unspecified etiology (Primary)  Assessment & Plan:  High suspicion for strep pharyngitis based on physical exam. Although rapid negative in clinic will send for culture and treat with amoxicillin at this time. Discussed with parent that COVID19 cannot be ruled out, they defer testing at this time. Discussed supportive care with tylenol/ibuprofen, salt water gargles, warm tea with honey, throat lozenges. They will seek medical attention immediately if symptoms worsen or persist. Call or return to clinic with concerns.     Orders:  -     Beta Strep Culture, Throat - Swab, Throat  -     POCT rapid strep A    Other orders  -     amoxicillin (AMOXIL) 500 MG capsule; Take 1 capsule by mouth 2 (Two) Times a Day for 10 days.  Dispense: 20 capsule; Refill: 0      Follow Up   Return if symptoms worsen or fail to improve.  Patient was given instructions and counseling regarding her condition or for health maintenance advice. Please see specific information pulled into the AVS if appropriate.

## 2021-11-11 LAB — BACTERIA SPEC AEROBE CULT: NORMAL

## 2022-05-02 DIAGNOSIS — Z30.41 ENCOUNTER FOR SURVEILLANCE OF CONTRACEPTIVE PILLS: ICD-10-CM

## 2022-05-02 RX ORDER — NORETHINDRONE-ETHIN. ESTRADIOL 1 MG-35MCG
1 TABLET ORAL DAILY
Qty: 28 TABLET | Refills: 12 | Status: SHIPPED | OUTPATIENT
Start: 2022-05-02 | End: 2023-05-02

## 2022-05-02 NOTE — TELEPHONE ENCOUNTER
Caller: MARCELINA STANTON    Relationship: Mother    Best call back number: [phone]    Requested Prescriptions:   Requested Prescriptions     Pending Prescriptions Disp Refills   • norethindrone-ethinyl estradiol (Cyclafem 1/35) 1-35 MG-MCG per tablet 28 tablet 12     Sig: Take 1 tablet by mouth Daily.        Pharmacy where request should be sent: [facility] [place] [facility] - [place] - [address] - [phone] [facility] [phone] FX     Additional details provided by patient: PATIENT HAS 2 DAYS LEFT OF THIS MEDICATION     Does the patient have less than a 3 day supply:  [x] Yes  [] No    Liza Lucio Rep   05/02/22 08:57 EDT

## 2022-07-11 ENCOUNTER — OFFICE VISIT (OUTPATIENT)
Dept: INTERNAL MEDICINE | Facility: CLINIC | Age: 22
End: 2022-07-11

## 2022-07-11 VITALS
HEART RATE: 73 BPM | BODY MASS INDEX: 27.65 KG/M2 | OXYGEN SATURATION: 100 % | TEMPERATURE: 98.1 F | SYSTOLIC BLOOD PRESSURE: 100 MMHG | HEIGHT: 67 IN | DIASTOLIC BLOOD PRESSURE: 62 MMHG | WEIGHT: 176.2 LBS | RESPIRATION RATE: 18 BRPM

## 2022-07-11 DIAGNOSIS — Z13.220 SCREENING FOR LIPID DISORDERS: ICD-10-CM

## 2022-07-11 DIAGNOSIS — Z20.2 EXPOSURE TO STD: Primary | ICD-10-CM

## 2022-07-11 LAB
ALBUMIN SERPL-MCNC: 4.6 G/DL (ref 3.5–5.2)
ALBUMIN/GLOB SERPL: 1.5 G/DL
ALP SERPL-CCNC: 46 U/L (ref 39–117)
ALT SERPL W P-5'-P-CCNC: 14 U/L (ref 1–33)
ANION GAP SERPL CALCULATED.3IONS-SCNC: 16.3 MMOL/L (ref 5–15)
AST SERPL-CCNC: 22 U/L (ref 1–32)
BASOPHILS # BLD AUTO: 0.05 10*3/MM3 (ref 0–0.2)
BASOPHILS NFR BLD AUTO: 0.8 % (ref 0–1.5)
BILIRUB SERPL-MCNC: 0.4 MG/DL (ref 0–1.2)
BUN SERPL-MCNC: 6 MG/DL (ref 6–20)
BUN/CREAT SERPL: 8.5 (ref 7–25)
C TRACH RRNA CVX QL NAA+PROBE: DETECTED
CALCIUM SPEC-SCNC: 10 MG/DL (ref 8.6–10.5)
CANDIDA SPECIES: NEGATIVE
CHLORIDE SERPL-SCNC: 103 MMOL/L (ref 98–107)
CHOLEST SERPL-MCNC: 172 MG/DL (ref 0–200)
CO2 SERPL-SCNC: 24.7 MMOL/L (ref 22–29)
CREAT SERPL-MCNC: 0.71 MG/DL (ref 0.57–1)
DEPRECATED RDW RBC AUTO: 36.3 FL (ref 37–54)
EGFRCR SERPLBLD CKD-EPI 2021: 123.5 ML/MIN/1.73
EOSINOPHIL # BLD AUTO: 0.11 10*3/MM3 (ref 0–0.4)
EOSINOPHIL NFR BLD AUTO: 1.8 % (ref 0.3–6.2)
ERYTHROCYTE [DISTWIDTH] IN BLOOD BY AUTOMATED COUNT: 11.6 % (ref 12.3–15.4)
GARDNERELLA VAGINALIS: NEGATIVE
GLOBULIN UR ELPH-MCNC: 3 GM/DL
GLUCOSE SERPL-MCNC: 78 MG/DL (ref 65–99)
HCT VFR BLD AUTO: 40.7 % (ref 34–46.6)
HDLC SERPL-MCNC: 64 MG/DL (ref 40–60)
HGB BLD-MCNC: 14.2 G/DL (ref 12–15.9)
HIV1+2 AB SER QL: NORMAL
IMM GRANULOCYTES # BLD AUTO: 0.02 10*3/MM3 (ref 0–0.05)
IMM GRANULOCYTES NFR BLD AUTO: 0.3 % (ref 0–0.5)
LDLC SERPL CALC-MCNC: 95 MG/DL (ref 0–100)
LDLC/HDLC SERPL: 1.47 {RATIO}
LYMPHOCYTES # BLD AUTO: 1.44 10*3/MM3 (ref 0.7–3.1)
LYMPHOCYTES NFR BLD AUTO: 23.8 % (ref 19.6–45.3)
MCH RBC QN AUTO: 30.6 PG (ref 26.6–33)
MCHC RBC AUTO-ENTMCNC: 34.9 G/DL (ref 31.5–35.7)
MCV RBC AUTO: 87.7 FL (ref 79–97)
MONOCYTES # BLD AUTO: 0.53 10*3/MM3 (ref 0.1–0.9)
MONOCYTES NFR BLD AUTO: 8.8 % (ref 5–12)
N GONORRHOEA RRNA SPEC QL NAA+PROBE: NOT DETECTED
NEUTROPHILS NFR BLD AUTO: 3.9 10*3/MM3 (ref 1.7–7)
NEUTROPHILS NFR BLD AUTO: 64.5 % (ref 42.7–76)
NRBC BLD AUTO-RTO: 0 /100 WBC (ref 0–0.2)
PLATELET # BLD AUTO: 303 10*3/MM3 (ref 140–450)
PMV BLD AUTO: 9.4 FL (ref 6–12)
POTASSIUM SERPL-SCNC: 5.5 MMOL/L (ref 3.5–5.2)
PROT SERPL-MCNC: 7.6 G/DL (ref 6–8.5)
RBC # BLD AUTO: 4.64 10*6/MM3 (ref 3.77–5.28)
RPR SER QL: NORMAL
SODIUM SERPL-SCNC: 144 MMOL/L (ref 136–145)
T VAGINALIS DNA VAG QL PROBE+SIG AMP: NEGATIVE
TRIGL SERPL-MCNC: 71 MG/DL (ref 0–150)
VLDLC SERPL-MCNC: 13 MG/DL (ref 5–40)
WBC NRBC COR # BLD: 6.05 10*3/MM3 (ref 3.4–10.8)

## 2022-07-11 PROCEDURE — 87510 GARDNER VAG DNA DIR PROBE: CPT | Performed by: PHYSICIAN ASSISTANT

## 2022-07-11 PROCEDURE — 80061 LIPID PANEL: CPT | Performed by: PHYSICIAN ASSISTANT

## 2022-07-11 PROCEDURE — 87491 CHLMYD TRACH DNA AMP PROBE: CPT | Performed by: PHYSICIAN ASSISTANT

## 2022-07-11 PROCEDURE — G0432 EIA HIV-1/HIV-2 SCREEN: HCPCS | Performed by: PHYSICIAN ASSISTANT

## 2022-07-11 PROCEDURE — 85025 COMPLETE CBC W/AUTO DIFF WBC: CPT | Performed by: PHYSICIAN ASSISTANT

## 2022-07-11 PROCEDURE — 36415 COLL VENOUS BLD VENIPUNCTURE: CPT | Performed by: PHYSICIAN ASSISTANT

## 2022-07-11 PROCEDURE — 86704 HEP B CORE ANTIBODY TOTAL: CPT | Performed by: PHYSICIAN ASSISTANT

## 2022-07-11 PROCEDURE — 87591 N.GONORRHOEAE DNA AMP PROB: CPT | Performed by: PHYSICIAN ASSISTANT

## 2022-07-11 PROCEDURE — 87340 HEPATITIS B SURFACE AG IA: CPT | Performed by: PHYSICIAN ASSISTANT

## 2022-07-11 PROCEDURE — 86695 HERPES SIMPLEX TYPE 1 TEST: CPT | Performed by: PHYSICIAN ASSISTANT

## 2022-07-11 PROCEDURE — 99213 OFFICE O/P EST LOW 20 MIN: CPT | Performed by: PHYSICIAN ASSISTANT

## 2022-07-11 PROCEDURE — 86696 HERPES SIMPLEX TYPE 2 TEST: CPT | Performed by: PHYSICIAN ASSISTANT

## 2022-07-11 PROCEDURE — 87660 TRICHOMONAS VAGIN DIR PROBE: CPT | Performed by: PHYSICIAN ASSISTANT

## 2022-07-11 PROCEDURE — 86706 HEP B SURFACE ANTIBODY: CPT | Performed by: PHYSICIAN ASSISTANT

## 2022-07-11 PROCEDURE — 86592 SYPHILIS TEST NON-TREP QUAL: CPT | Performed by: PHYSICIAN ASSISTANT

## 2022-07-11 PROCEDURE — 87480 CANDIDA DNA DIR PROBE: CPT | Performed by: PHYSICIAN ASSISTANT

## 2022-07-11 PROCEDURE — 86803 HEPATITIS C AB TEST: CPT | Performed by: PHYSICIAN ASSISTANT

## 2022-07-11 PROCEDURE — 80053 COMPREHEN METABOLIC PANEL: CPT | Performed by: PHYSICIAN ASSISTANT

## 2022-07-11 RX ORDER — DOXYCYCLINE HYCLATE 100 MG/1
100 CAPSULE ORAL 2 TIMES DAILY
Qty: 14 CAPSULE | Refills: 0 | Status: SHIPPED | OUTPATIENT
Start: 2022-07-11 | End: 2022-07-18

## 2022-07-11 NOTE — ASSESSMENT & PLAN NOTE
We will go ahead and get blood work today and have patient follow-up for annual exam with PCP Gretchen Daniel in 1 month.

## 2022-07-11 NOTE — PROGRESS NOTES
"Chief Complaint  Annual Exam (Not fasting, wants bloodwork) and Exposure to STD (Wants to check for it)    Subjective          Shree Saenz presents to [facility]  Requesting STD testing -currently sexually active, occasionally uses condoms.  She has noticed some vaginal discharge but states that this is normal for her.  No new partners, no multiple partners, no known STD's.     Last menstrual cycle 1 week ago.      Is needing annual exam/pap.       Objective   Vital Signs:   /62 (BP Location: Left arm, Patient Position: Sitting, Cuff Size: Adult)   Pulse 73   Temp 98.1 °F (36.7 °C) (Oral)   Resp 18   Ht 170.2 cm (67\")   Wt 79.9 kg (176 lb 3.2 oz)   SpO2 100%   BMI 27.60 kg/m²     Physical Exam  Vitals reviewed.   Constitutional:       Appearance: Normal appearance. She is well-developed.   HENT:      Head: Normocephalic and atraumatic.   Eyes:      Conjunctiva/sclera: Conjunctivae normal.      Pupils: Pupils are equal, round, and reactive to light.   Cardiovascular:      Rate and Rhythm: Normal rate and regular rhythm.      Heart sounds: No murmur heard.    No friction rub. No gallop.   Pulmonary:      Effort: Pulmonary effort is normal.      Breath sounds: Normal breath sounds. No wheezing or rhonchi.   Skin:     General: Skin is warm and dry.   Neurological:      Mental Status: She is alert and oriented to person, place, and time.      Cranial Nerves: No cranial nerve deficit.   Psychiatric:         Mood and Affect: Mood and affect normal.         Behavior: Behavior normal.         Thought Content: Thought content normal.         Judgment: Judgment normal.        Result Review :          Procedures      Assessment and Plan    Diagnoses and all orders for this visit:    1. Exposure to STD (Primary)  Assessment & Plan:  Will check STDs today in office.  Discussed with patient importance of adhering to condom use with all intercourse to lower risk of " STDs.  Will have patient follow-up with PCP for annual Pap exam in 1 month.    Orders:  -     Gardnerella vaginalis, Trichomonas vaginalis, Candida albicans, DNA - Swab, Vagina; Future  -     Gardnerella vaginalis, Trichomonas vaginalis, Candida albicans, DNA - Swab, Vagina  -     HIV-1 / O / 2 Ag / Antibody 4th Generation  -     HSV 1 & 2 - Specific Antibody, IgG  -     RPR  -     Hepatitis C Antibody  -     Hepatitis B Virus (HBV) Screening and Diagnosis  -     Chlamydia trachomatis, Neisseria gonorrhoeae, PCR - Urine, Urine, Clean Catch    2. Screening for lipid disorders  Assessment & Plan:  We will go ahead and get blood work today and have patient follow-up for annual exam with PCP Gretchen Daniel in 1 month.    Orders:  -     CBC & Differential  -     Comprehensive Metabolic Panel  -     Lipid Panel            Follow Up   Return in about 4 weeks (around 8/8/2022).  Patient was given instructions and counseling regarding her condition or for health maintenance advice. Please see specific information pulled into the AVS if appropriate.

## 2022-07-11 NOTE — ASSESSMENT & PLAN NOTE
Will check STDs today in office.  Discussed with patient importance of adhering to condom use with all intercourse to lower risk of STDs.  Will have patient follow-up with PCP for annual Pap exam in 1 month.

## 2022-07-12 ENCOUNTER — TELEPHONE (OUTPATIENT)
Dept: INTERNAL MEDICINE | Facility: CLINIC | Age: 22
End: 2022-07-12

## 2022-07-12 LAB — HCV AB SER DONR QL: NORMAL

## 2022-07-12 RX ORDER — FLUCONAZOLE 150 MG/1
TABLET ORAL
Qty: 2 TABLET | Refills: 0 | Status: SHIPPED | OUTPATIENT
Start: 2022-07-12

## 2022-07-12 NOTE — TELEPHONE ENCOUNTER
Caller: MARCELINA STANTON    Relationship: Mother    Best call back number:[phone]    What medication are you requesting: SOMETHING FOR A POSSIBLE  YEAST INFECTION     What are your current symptoms: PATIENT HAS NO SYMPTOMS YET BUT SHE IS ANTICIPATING ONE SINCE SHE IS ON AN ANTIBIOTIC AND USUALLY GETS ONE PER MOTHER     How long have you been experiencing symptoms: NO SYMPTOMS JUST PREVENTATIVE SINCE SHE IS STARTING ANTIBIOTICS TODAY     Have you had these symptoms before:    [x] Yes  [] No    Have you been treated for these symptoms before:   [x] Yes  [] No    If a prescription is needed, what is your preferred pharmacy and phone number:    [facility] -  [place] [place] - [address] - [phone] [facility] [phone]         Additional notes: PATIENT WILL BE LEAVING FOR VACATION ON 07/16/2022 AND WOULD LIKE IT CALLED IN ASAP         MARCELINA STANTON IS ON THE  VERBAL.

## 2022-07-13 LAB
HBV CORE AB SERPL QL IA: NEGATIVE
HBV SURFACE AB SER QL: REACTIVE
HBV SURFACE AG SERPL QL IA: NEGATIVE
HSV1 IGG SER IA-ACNC: <0.91 INDEX (ref 0–0.9)
HSV2 IGG SER IA-ACNC: <0.91 INDEX (ref 0–0.9)
IMP & REVIEW OF LAB RESULTS: NORMAL
LABORATORY COMMENT REPORT: NORMAL

## 2022-07-13 RX ORDER — FLUCONAZOLE 150 MG/1
150 TABLET ORAL ONCE
Qty: 2 TABLET | Refills: 0 | Status: SHIPPED | OUTPATIENT
Start: 2022-07-13 | End: 2022-07-13

## 2022-07-13 NOTE — TELEPHONE ENCOUNTER
Called  patient and informed of provider message. Patient is requesting a prescription for a yeast infection because [place] and she was informed it is common to get yeast infections while on this medication and she is prone to yeast infections. She was hoping to get the prescription just in case since she will not be local.   Please advise.   
Caller: Shree Saenz    Relationship: Self    Best call back number: [phone]    What medication are you requesting: YEAST INFECTION    If a prescription is needed, what is your preferred pharmacy and phone number: [facility] [place] [facility] -  [place], [place] - [address] [facility] - [phone]  - [phone] FX     Additional notes:    PATIENT STATES SHE WOULD LIKE MEDICATION  FOR A LIKELY YEAST INFECTION DUE TO ANTIBIOTICS THAT SHE IS GOING TO BEGIN TAKING. PLEASE CALL PATIENT AND ADVISE WHEN PRESCRIPTION IS SENT IT.    
Medication sent.  
PATIENT STATES SHE WOULD LIKE MEDICATION  FOR A LIKELY YEAST INFECTION DUE TO ANTIBIOTICS THAT SHE IS GOING TO BEGIN TAKING. PLEASE CALL PATIENT AND ADVISE WHEN PRESCRIPTION IS SENT IT.     Patient was seen and prescribed antibiotics yesterday.   Please advise   
Patient was tested yesterday for a yeast infection and was negative. She should monitor closely for symptoms while on the antibiotic and call with any concerns.  
Pt notified.  
Color consistent with ethnicity/race, warm, dry intact, resilient.

## 2022-07-13 NOTE — PROGRESS NOTES
I have reviewed the notes, assessments, and/or procedures performed by Alana Matta PA-C, I concur with her documentation of Shree Saenz.

## 2022-07-13 NOTE — TELEPHONE ENCOUNTER
Informed patient's mother medication was sent to the pharmacy. Confirmed correct pharmacy. Also relayed to not take the medication unless she has yeast infection symptoms. Voiced understanding.

## 2022-07-18 ENCOUNTER — TELEPHONE (OUTPATIENT)
Dept: INTERNAL MEDICINE | Facility: CLINIC | Age: 22
End: 2022-07-18

## 2022-07-18 NOTE — TELEPHONE ENCOUNTER
Called patient with no answer; left VM to call back on behalf of results.      HUB PLEASE ADVISE...     We need to go over results.  Please see  Message below from provider:     Other lab results negative

## 2022-08-12 ENCOUNTER — OFFICE VISIT (OUTPATIENT)
Dept: INTERNAL MEDICINE | Facility: CLINIC | Age: 22
End: 2022-08-12

## 2022-08-12 VITALS
WEIGHT: 180.8 LBS | BODY MASS INDEX: 28.38 KG/M2 | HEART RATE: 74 BPM | TEMPERATURE: 96.8 F | SYSTOLIC BLOOD PRESSURE: 136 MMHG | DIASTOLIC BLOOD PRESSURE: 80 MMHG | OXYGEN SATURATION: 99 % | HEIGHT: 67 IN

## 2022-08-12 DIAGNOSIS — A74.9 CHLAMYDIA: ICD-10-CM

## 2022-08-12 DIAGNOSIS — Z01.419 ENCOUNTER FOR ROUTINE GYNECOLOGICAL EXAMINATION WITH PAPANICOLAOU SMEAR OF CERVIX: Primary | ICD-10-CM

## 2022-08-12 DIAGNOSIS — R03.0 ELEVATED BLOOD PRESSURE READING: ICD-10-CM

## 2022-08-12 LAB
C TRACH RRNA CVX QL NAA+PROBE: NOT DETECTED
CANDIDA SPECIES: NEGATIVE
GARDNERELLA VAGINALIS: NEGATIVE
N GONORRHOEA RRNA SPEC QL NAA+PROBE: NOT DETECTED
T VAGINALIS DNA VAG QL PROBE+SIG AMP: NEGATIVE

## 2022-08-12 PROCEDURE — G0123 SCREEN CERV/VAG THIN LAYER: HCPCS | Performed by: NURSE PRACTITIONER

## 2022-08-12 PROCEDURE — 87591 N.GONORRHOEAE DNA AMP PROB: CPT | Performed by: NURSE PRACTITIONER

## 2022-08-12 PROCEDURE — 87480 CANDIDA DNA DIR PROBE: CPT | Performed by: NURSE PRACTITIONER

## 2022-08-12 PROCEDURE — 99395 PREV VISIT EST AGE 18-39: CPT | Performed by: NURSE PRACTITIONER

## 2022-08-12 PROCEDURE — 87510 GARDNER VAG DNA DIR PROBE: CPT | Performed by: NURSE PRACTITIONER

## 2022-08-12 PROCEDURE — 87491 CHLMYD TRACH DNA AMP PROBE: CPT | Performed by: NURSE PRACTITIONER

## 2022-08-12 PROCEDURE — 87660 TRICHOMONAS VAGIN DIR PROBE: CPT | Performed by: NURSE PRACTITIONER

## 2022-08-12 NOTE — PROGRESS NOTES
Subjective   History of Present Illness    Shree Saenz is a 22 y.o. female who presents for annual exam.      Patient tested positive for chlamydia one month ago, was treated with doxycycline. Patient had no symptoms at time of positive test, was in clinic for screening purposes only. States she has experienced no symptoms since completing antibiotic course. She would like to have testing repeated today as well as proceed with routine PAP smear.    Obstetric History:  OB History    No obstetric history on file.        Menstrual History:     No LMP recorded.       Sexual History:         Current contraception: OCP (estrogen/progesterone)  History of abnormal Pap smear: no, last pap smear 2021  Received Gardasil immunization: unsure  Family history of uterine or ovarian cancer: no  Family History of cervical cancer: yes - maternal aunt  Family History of colon cancer/colon polyps: no  Regular self breast exam: no  History of abnormal mammogram: no  Family history of breast cancer: no  History of abnormal lipids: no    The following portions of the patient's history were reviewed and updated as appropriate: allergies, current medications, past family history, past medical history, past social history, past surgical history and problem list.      Objective     Physical Exam  Vitals and nursing note reviewed. Exam conducted with a chaperone present (Vincenzo).   Constitutional:       Appearance: Normal appearance.   Cardiovascular:      Rate and Rhythm: Normal rate and regular rhythm.   Pulmonary:      Effort: Pulmonary effort is normal.      Breath sounds: Normal breath sounds.   Chest:   Breasts:      Right: Normal. No axillary adenopathy.      Left: Normal. No axillary adenopathy.       Abdominal:      General: Bowel sounds are normal.      Palpations: Abdomen is soft.   Genitourinary:     Vagina: Normal.      Cervix: Cervical bleeding present.      Uterus: Normal.       Adnexa: Right adnexa normal and left adnexa  "normal.      Rectum: Normal.   Musculoskeletal:      Cervical back: Normal range of motion and neck supple.   Lymphadenopathy:      Upper Body:      Right upper body: No axillary adenopathy.      Left upper body: No axillary adenopathy.   Neurological:      General: No focal deficit present.      Mental Status: She is alert and oriented to person, place, and time.   Psychiatric:         Mood and Affect: Mood normal.           /80 (BP Location: Right arm, Patient Position: Sitting, Cuff Size: Adult)   Pulse 74   Temp 96.8 °F (36 °C) (Temporal)   Ht 170.2 cm (67\")   Wt 82 kg (180 lb 12.8 oz)   SpO2 99%   BMI 28.32 kg/m²       Assessment & Plan   Diagnoses and all orders for this visit:    1. Encounter for routine gynecological examination with Papanicolaou smear of cervix (Primary)  Assessment & Plan:  Cervical bleeding on exam, LMP one week ago. Discussed preventative care with routine PAP smears. Will determine further intervention based on results of PAP smear.      Orders:  -     IGP,rfx Aptima HPV All Pth  -     Gardnerella vaginalis, Trichomonas vaginalis, Candida albicans, DNA - Swab, Vagina; Future  -     Gardnerella vaginalis, Trichomonas vaginalis, Candida albicans, DNA - Swab, Vagina    2. Chlamydia  Assessment & Plan:  Discussed it is typically recommended to repeat testing three months post treatment or test of cure four weeks after treatment completion, patient prefers to proceed with testing again today. Will determine further plan of care based on results.     Orders:  -     Chlamydia trachomatis, Neisseria gonorrhoeae, PCR - , Urine, Clean Catch    3. Elevated blood pressure reading  Comments:  Mild elevation, patient should monitor blood pressure and call clinic with elevations consistently greater than 130/80.       Await pap smear results.             "

## 2022-08-12 NOTE — ASSESSMENT & PLAN NOTE
Discussed it is typically recommended to repeat testing three months post treatment or test of cure four weeks after treatment completion, patient prefers to proceed with testing again today. Will determine further plan of care based on results.

## 2022-08-12 NOTE — ASSESSMENT & PLAN NOTE
Cervical bleeding on exam, LMP one week ago. Discussed preventative care with routine PAP smears. Will determine further intervention based on results of PAP smear.

## 2022-08-16 LAB
CONV .: NORMAL
CYTOLOGIST CVX/VAG CYTO: NORMAL
CYTOLOGY CVX/VAG DOC CYTO: NORMAL
CYTOLOGY CVX/VAG DOC THIN PREP: NORMAL
DX ICD CODE: NORMAL
HIV 1 & 2 AB SER-IMP: NORMAL
OTHER STN SPEC: NORMAL
STAT OF ADQ CVX/VAG CYTO-IMP: NORMAL

## 2025-05-23 ENCOUNTER — TELEPHONE (OUTPATIENT)
Dept: OBSTETRICS AND GYNECOLOGY | Age: 25
End: 2025-05-23
Payer: COMMERCIAL

## 2025-05-23 NOTE — TELEPHONE ENCOUNTER
Left voicemail. Patient needs to reschedule 07/10/25 appointment with TRINO Green due to provider being out of office. Please reschedule patient if she calls in.    HUB TO RELAY

## 2025-07-15 ENCOUNTER — OFFICE VISIT (OUTPATIENT)
Dept: OBSTETRICS AND GYNECOLOGY | Age: 25
End: 2025-07-15
Payer: COMMERCIAL

## 2025-07-15 VITALS
DIASTOLIC BLOOD PRESSURE: 75 MMHG | HEIGHT: 67 IN | WEIGHT: 170 LBS | HEART RATE: 62 BPM | SYSTOLIC BLOOD PRESSURE: 117 MMHG | BODY MASS INDEX: 26.68 KG/M2

## 2025-07-15 DIAGNOSIS — Z30.011 ENCOUNTER FOR INITIAL PRESCRIPTION OF CONTRACEPTIVE PILLS: ICD-10-CM

## 2025-07-15 DIAGNOSIS — Z11.3 SCREEN FOR STD (SEXUALLY TRANSMITTED DISEASE): ICD-10-CM

## 2025-07-15 DIAGNOSIS — Z01.419 ENCOUNTER FOR GYNECOLOGICAL EXAMINATION WITHOUT ABNORMAL FINDING: Primary | ICD-10-CM

## 2025-07-15 LAB
HBV SURFACE AG SERPL QL IA: NORMAL
HCV AB SER QL: NORMAL
HIV 1+2 AB+HIV1 P24 AG SERPL QL IA: NORMAL
TREPONEMA PALLIDUM IGG+IGM AB [PRESENCE] IN SERUM OR PLASMA BY IMMUNOASSAY: NORMAL

## 2025-07-15 PROCEDURE — 87661 TRICHOMONAS VAGINALIS AMPLIF: CPT | Performed by: OBSTETRICS & GYNECOLOGY

## 2025-07-15 PROCEDURE — 86780 TREPONEMA PALLIDUM: CPT | Performed by: OBSTETRICS & GYNECOLOGY

## 2025-07-15 PROCEDURE — 87624 HPV HI-RISK TYP POOLED RSLT: CPT | Performed by: OBSTETRICS & GYNECOLOGY

## 2025-07-15 PROCEDURE — G0432 EIA HIV-1/HIV-2 SCREEN: HCPCS | Performed by: OBSTETRICS & GYNECOLOGY

## 2025-07-15 PROCEDURE — 87340 HEPATITIS B SURFACE AG IA: CPT | Performed by: OBSTETRICS & GYNECOLOGY

## 2025-07-15 PROCEDURE — 86803 HEPATITIS C AB TEST: CPT | Performed by: OBSTETRICS & GYNECOLOGY

## 2025-07-15 PROCEDURE — G0123 SCREEN CERV/VAG THIN LAYER: HCPCS | Performed by: OBSTETRICS & GYNECOLOGY

## 2025-07-15 PROCEDURE — 87491 CHLMYD TRACH DNA AMP PROBE: CPT | Performed by: OBSTETRICS & GYNECOLOGY

## 2025-07-15 PROCEDURE — 87591 N.GONORRHOEAE DNA AMP PROB: CPT | Performed by: OBSTETRICS & GYNECOLOGY

## 2025-07-15 RX ORDER — NORGESTREL-ETHINYL ESTRADIOL 0.3-0.03MG
1 TABLET ORAL DAILY
Qty: 84 TABLET | Refills: 4 | Status: SHIPPED | OUTPATIENT
Start: 2025-07-15

## 2025-07-15 NOTE — PROGRESS NOTES
"Well Woman Visit    CC: Annual well woman exam       HPI:   24 y.o. Contraception or HRT: Contraception:  Birth control pill  Menses:   q mon, lasts 7 days, changes products q 4hrs on heaviest days.   Pain:  Moderate, not too bad  Incontinence concerns: No  Hx of abnormal pap:  Yes  Pt has no complaints today. Desires full std screen. Denies sx. Also requests bcp. States was getting them from a health clinic but now she has insurance and needs rx for them. Cannot remember the name of what she was given.       History: PMHx, Meds, Allergies, PSHx, Social Hx, and POBHx all reviewed and updated.      PHYSICAL EXAM:  /75   Pulse 62   Ht 170.2 cm (67.01\")   Wt 77.1 kg (170 lb)   LMP 2025   BMI 26.62 kg/m²   General- NAD, alert and oriented, appropriate  Psych- Normal mood, good memory  Neck- No masses, no thyroid enlargement  CV- Regular rhythm, no murnurs  Resp- CTA to bases, no wheezes  Abdomen- Soft, non distended, non tender, no masses    Breast left-  Bilaterally symmetrical, no masses, non tender, no nipple discharge  Breast right- Bilaterally symmetrical, no masses, non tender, no nipple discharge    External genitalia- Normal female, no lesions  Urethra/meatus- Normal, no masses, non tender, no prolapse  Bladder- Normal, no masses, non tender, no prolapse  Vagina- Normal, no atrophy, no lesions, no discharge, no prolapse  Cvx- Normal, no lesions, no discharge, No cervical motion tenderness  Uterus- Normal size, shape & consistency.  Non tender, mobile.  Adnexa- No mass, non tender  Anus/Rectum/Perineum- Not performed    Lymphatic- No palpable neck, axillary, or groin nodes  Ext- No edema, no cyanosis    Skin- No lesions, no rashes, no acanthosis nigricans        ASSESSMENT and PLAN:  WWE and Contraception    Diagnoses and all orders for this visit:    1. Encounter for gynecological examination without abnormal finding (Primary)  -     IGP,CtNgTv,Apt HPV    2. Encounter for initial " prescription of contraceptive pills  -     norgestrel-ethinyl estradiol (Cryselle-28) 0.3-30 MG-MCG per tablet; Take 1 tablet by mouth Daily.  Dispense: 84 tablet; Refill: 4    3. Screen for STD (sexually transmitted disease)  -     IGP,CtNgTv,Apt HPV  -     Hepatitis B Surface Antigen  -     Hepatitis C Antibody  -     HIV-1 & HIV-2 Antibodies  -     Treponema pallidum AB w/Reflex RPR        Counseling:     OCP/Hormone use risk MACK  Track menses, RTO IF <q21d, >7d long, or heavy    Domestic violence/abuse screen: negative    Depression screen: no SI    Preventative:   BREAST HEALTH- Monthly self breast exam importance and how to reviewed. MMG and/or MRI (prn) reviewed per society guidelines and her individual history. Mammo/MRI screen: Not medically needed.  CERVICAL CANCER Screening- Reviewed current ASCCP guidelines for screening w and wo cotest HPV, age specific.  Screen: Updated today.  COLON CANCER Screening- Reviewed current medical society guidelines and options.  Colonoscopy screen:  Not medically needed.  SEXUAL HEALTH: STD screening desired.  Ordered.  VACCINATIONS Recommended: Flu vaccine annually, Gardisil/HPV vaccine 9-25yo, up to 44yo.  Importance discussed, risk being unvaccinated reviewed.  Questions answered  Smoking status- NON SMOKER.  Importance of avoiding second hand smoke.  Myriad : does not qualify.  Gardasil status: completed.      She understands the importance of having any ordered tests to be performed in a timely fashion.  She is encouraged to review her results online and/or contact or office if she has questions.     Follow Up:  Return in about 1 year (around 7/15/2026) for Annual physical.      aYna Green, APRN  07/15/2025

## 2025-07-18 LAB
C TRACH RRNA CVX QL NAA+PROBE: NEGATIVE
CYTOLOGIST CVX/VAG CYTO: NORMAL
CYTOLOGY CVX/VAG DOC CYTO: NORMAL
CYTOLOGY CVX/VAG DOC THIN PREP: NORMAL
DX ICD CODE: NORMAL
HPV I/H RISK 4 DNA CVX QL PROBE+SIG AMP: NEGATIVE
Lab: NORMAL
N GONORRHOEA RRNA CVX QL NAA+PROBE: NEGATIVE
OTHER STN SPEC: NORMAL
SERVICE CMNT-IMP: NORMAL
STAT OF ADQ CVX/VAG CYTO-IMP: NORMAL
T VAGINALIS RRNA SPEC QL NAA+PROBE: NEGATIVE

## 2025-07-23 ENCOUNTER — TREATMENT (OUTPATIENT)
Dept: PHYSICAL THERAPY | Facility: CLINIC | Age: 25
End: 2025-07-23
Payer: COMMERCIAL

## 2025-07-23 DIAGNOSIS — M25.621 ELBOW STIFFNESS, RIGHT: ICD-10-CM

## 2025-07-23 DIAGNOSIS — R20.0 NUMBNESS AND TINGLING OF RIGHT ARM: ICD-10-CM

## 2025-07-23 DIAGNOSIS — R20.2 NUMBNESS AND TINGLING OF RIGHT ARM: ICD-10-CM

## 2025-07-23 DIAGNOSIS — S52.124A CLOSED NONDISPLACED FRACTURE OF HEAD OF RIGHT RADIUS, INITIAL ENCOUNTER: Primary | ICD-10-CM

## 2025-07-23 DIAGNOSIS — M25.521 RIGHT ELBOW PAIN: ICD-10-CM

## 2025-07-23 NOTE — PROGRESS NOTES
"Occupational Therapy Initial Evaluation and Plan of Care  [facility]  OT: [address]  [place] [address]    Patient: Shree Saenz   : 2000  Diagnosis/ICD-10 Code:  Closed nondisplaced fracture of head of right radius, initial encounter [S52.124A]  Referring practitioner: Rickie Friend MD  Date of Initial Visit: 2025  Today's Date: 2025  Patient seen for 1 sessions    Visit Diagnoses:      ICD-10-CM ICD-9-CM   1. Closed nondisplaced fracture of head of right radius, initial encounter  S52.124A 813.05   2. Right elbow pain  M25.521 719.42   3. Elbow stiffness, right  M25.621 719.52   4. Numbness and tingling of right arm  R20.0 782.0    R20.2             Subjective Questionnaire: QuickDASH:       Subjective Evaluation    History of Present Illness  Mechanism of injury: Pt is a RHD 25 y/o female who presents to therapy with c/o R elbow pain, stiffness, and numbness/tingling. Pt reports on  she suffered a FOOSH. She reports her elbow \"came in, went back out, and immediately just dropped\". Xrays revealed: nondisplaced fracture of the R radial head. Pt reports she has been seen for 2 visits at [facility] but will be transferring her care here. Pt is a  at [employer] but is off work currently due to injury.  PMHx: R humerus fracture.      Pain  Current pain ratin  At best pain ratin  At worst pain ratin  Location: R elbow  Quality: \"like a tug on the middle of my elbow and then a shock\"  Relieving factors: rest and heat  Aggravating factors: lifting  Progression: improved    Social Support  Lives with: parents (brother)    Hand dominance: right    Diagnostic Tests  X-ray: abnormal    Treatments  Previous treatment: physical therapy  Patient Goals  Patient goals for therapy: decreased pain, increased motion, increased strength, independence with ADLs/IADLs, return to sport/leisure activities and return to work  Patient goal: \"I just want it like this arm in terms of " "flexibility and to be able to lift things again\"         Objective          Tenderness     Right Elbow   Tenderness in the medial epicondyle and radial head.     Right Wrist/Hand   Tenderness in the medial epicondyle.     Additional Tenderness Details  Moderate    Neurological Testing     Additional Neurological Details  Pt reports no current numbness/tingling. Pt reports occasional tingling in R medial elbow and numbness in distal end of R thumb.    Active Range of Motion     Right Shoulder   Flexion: WFL  Abduction: WFL  External rotation BTH: WFL  Internal rotation BTB: WFL    Right Elbow   Flexion: 110 (pt reports pulling in elbow) degrees   Extension: WFL  Forearm supination: WFL  Forearm pronation: Right elbow active pronation: Pt reports pulling. WFL    Right Wrist   Normal active range of motion    Additional Active Range of Motion Details  Pt able to make composite fist and fully extend digits.    Strength/Myotome Testing     Left Wrist/Hand      (2nd hand position)     Trial 1: 60 lbs    Trial 2: 51 lbs    Trial 3: 47 lbs    Average: 52.67 lbs    Additional Strength Details  Deferred secondary to healing fx.  Deferred in R UE due to healing fx.    Swelling   Left Elbow Girth Measurements   Joint line: 26 cm    Right Elbow Girth Measurements   Joint line: 27 cm          Assessment & Plan       Assessment  Impairments: abnormal or restricted ROM, activity intolerance, impaired physical strength, lacks appropriate home exercise program and pain with function   Functional limitations: carrying objects, lifting, sleeping, pulling, pushing and uncomfortable because of pain   Assessment details: Pt will benefit from skilled OT secondary to decreased strength/ROM and increased pain that limits pt ability to complete ADLs.  Prognosis: good    Goals  Plan Goals: Patient complains of pain in R elbow.  LTG 1  12 weeks:  Decrease R elbow pain to 1/10 at worst to improve ADL status.  Status:  New  STG 1  8 weeks:  " Decrease R elbow pain to 3/10 at worst.  Status:  New    2.  Patient displays decreased R elbow and forearm AROM.  LTG 2  12 weeks:  Increase R elbow flexion to 140 degrees to improve ability to perform ADL's.  Status:  New  STG 2  8 weeks:  Increase R elbow flexion to 125 degrees.  Status:  New    3.  Patient displays decreased R arm and hand strength  LTG 3  12 weeks:  Increase R elbow strength to 5/5 MMT and  strength to 55 lbs to improve ability to reach, lift, carry and handle objects.  Status:  New  STG 3  8 weeks:  Patient will display good tolerance to strength testing.  Status:  New    4.  Carrying, moving, and handling objects functional limitation.  LTG 4  12 weeks:  The patient will demonstrate 1-19 % limitation by achieving a score of 15/55 on the Quick DASH.  Status: New  STG 4  8 weeks:  The patient will demonstrate 1-19 % limitation by achieving a score of 19/55 on the Quick DASH.  Status:  New        Plan  Planned modality interventions: cryotherapy and thermotherapy (hydrocollator packs)  Planned therapy interventions: body mechanics training, fine motor coordination training, flexibility, functional ROM exercises, home exercise program, joint mobilization, manual therapy, neuromuscular re-education, postural training, soft tissue mobilization, strengthening, stretching and therapeutic activities  Frequency: 2x week  Duration in weeks: 12  Treatment plan discussed with: patient    Pt is indicated for skilled occupational therapy services.  Timed:           Therapeutic Exercise:    9     mins  [id number];     Therapeutic Activity:     8     mins  89492;       Un-Timed:  Low Eval     29     Mins  [id number]    Timed Treatment:   17   mins   Total Treatment:     46   mins    Start time: 0909  End time: 0955    OT SIGNATURE: Jasvir Gunter OT   DATE TREATMENT INITIATED: 7/23/2025  [place] License: [id number]    Initial Certification  Certification Period: 10/20/2025  I certify that the therapy services are furnished  while this patient is under my care.  The services outlined above are required by this patient, and will be reviewed every 90 days.     PHYSICIAN: Rickie Friend MD      DATE:   MD NPI: [id number]  Please sign and return via fax to   [phone].. Thank you, [facility]ational Therapy.

## 2025-07-31 ENCOUNTER — TREATMENT (OUTPATIENT)
Dept: PHYSICAL THERAPY | Facility: CLINIC | Age: 25
End: 2025-07-31
Payer: COMMERCIAL

## 2025-07-31 DIAGNOSIS — M25.621 ELBOW STIFFNESS, RIGHT: ICD-10-CM

## 2025-07-31 DIAGNOSIS — R20.0 NUMBNESS AND TINGLING OF RIGHT ARM: ICD-10-CM

## 2025-07-31 DIAGNOSIS — R20.2 NUMBNESS AND TINGLING OF RIGHT ARM: ICD-10-CM

## 2025-07-31 DIAGNOSIS — M25.521 RIGHT ELBOW PAIN: ICD-10-CM

## 2025-07-31 DIAGNOSIS — S52.124A CLOSED NONDISPLACED FRACTURE OF HEAD OF RIGHT RADIUS, INITIAL ENCOUNTER: Primary | ICD-10-CM

## 2025-07-31 NOTE — PROGRESS NOTES
Occupational Therapy Daily Treatment Note  [facility] OT: [address] [place] [address]      Patient: Shree Seanz   : 2000  Diagnosis/ICD-10 Code:  Closed nondisplaced fracture of head of right radius, initial encounter [S52.124A]  Referring practitioner: Rickie Friend MD  Date of Initial Visit: Type: THERAPY  Noted: 2025  Today's Date: 2025  Patient seen for 2 sessions    Visit Diagnoses:      ICD-10-CM ICD-9-CM   1. Closed nondisplaced fracture of head of right radius, initial encounter  S52.124A 813.05   2. Right elbow pain  M25.521 719.42   3. Elbow stiffness, right  M25.621 719.52   4. Numbness and tingling of right arm  R20.0 782.0    R20.2           Subjective   Shree Saenz reports: 4/10 pain R elbow. Pt reports occasional popping in elbow when reaching out and grasping an item.    Objective     See Exercise, Manual, and Modality Logs for complete treatment.       Assessment/Plan  Pt displays improved elbow flexion but reports pain in proximal forearm. OT encourages pt to complete only in pain free range. Pt reports improved comfort. Good tolerance to remainder of treatment. Pt continues with decreased fxl strength/ROM and increased pain that limits ability to complete ADLs/IADLs.  Progress per Plan of Care           Timed:  Therapeutic Exercise:    9     mins  01661;     Therapeutic Activity:     23     mins  87757;       Timed Treatment:   32   mins   Total Treatment:     32   mins    Start time: 1532  End time: 1604    Jasvir Gunter OT  Occupational Therapist  [place] License: [id number]  NPI: [id number]

## 2025-08-07 ENCOUNTER — TRANSCRIBE ORDERS (OUTPATIENT)
Dept: PHYSICAL THERAPY | Facility: CLINIC | Age: 25
End: 2025-08-07
Payer: COMMERCIAL

## 2025-08-07 ENCOUNTER — TREATMENT (OUTPATIENT)
Dept: PHYSICAL THERAPY | Facility: CLINIC | Age: 25
End: 2025-08-07
Payer: COMMERCIAL

## 2025-08-07 DIAGNOSIS — M25.521 RIGHT ELBOW PAIN: ICD-10-CM

## 2025-08-07 DIAGNOSIS — S52.124A CLOSED NONDISPLACED FRACTURE OF HEAD OF RIGHT RADIUS, INITIAL ENCOUNTER: Primary | ICD-10-CM

## 2025-08-07 DIAGNOSIS — R20.0 NUMBNESS AND TINGLING OF RIGHT ARM: ICD-10-CM

## 2025-08-07 DIAGNOSIS — R20.2 NUMBNESS AND TINGLING OF RIGHT ARM: ICD-10-CM

## 2025-08-07 DIAGNOSIS — M25.621 ELBOW STIFFNESS, RIGHT: ICD-10-CM

## 2025-08-13 ENCOUNTER — TREATMENT (OUTPATIENT)
Dept: PHYSICAL THERAPY | Facility: CLINIC | Age: 25
End: 2025-08-13
Payer: COMMERCIAL

## 2025-08-13 DIAGNOSIS — M25.621 ELBOW STIFFNESS, RIGHT: ICD-10-CM

## 2025-08-13 DIAGNOSIS — R20.2 NUMBNESS AND TINGLING OF RIGHT ARM: ICD-10-CM

## 2025-08-13 DIAGNOSIS — R20.0 NUMBNESS AND TINGLING OF RIGHT ARM: ICD-10-CM

## 2025-08-13 DIAGNOSIS — M25.521 RIGHT ELBOW PAIN: ICD-10-CM

## 2025-08-13 DIAGNOSIS — S52.124A CLOSED NONDISPLACED FRACTURE OF HEAD OF RIGHT RADIUS, INITIAL ENCOUNTER: Primary | ICD-10-CM
